# Patient Record
Sex: FEMALE | Race: WHITE | Employment: FULL TIME | URBAN - NONMETROPOLITAN AREA
[De-identification: names, ages, dates, MRNs, and addresses within clinical notes are randomized per-mention and may not be internally consistent; named-entity substitution may affect disease eponyms.]

---

## 2019-01-31 ENCOUNTER — HOSPITAL ENCOUNTER (OUTPATIENT)
Age: 54
Discharge: HOME | End: 2019-01-31
Payer: COMMERCIAL

## 2019-01-31 LAB
ANION GAP SERPL CALCULATED.3IONS-SCNC: 16 MEQ/L (ref 8–16)
BUN BLDV-MCNC: 16 MG/DL (ref 7–22)
CALCIUM SERPL-MCNC: 9.5 MG/DL (ref 8.5–10.5)
CHLORIDE BLD-SCNC: 103 MEQ/L (ref 98–111)
CO2: 24 MEQ/L (ref 23–33)
CREAT SERPL-MCNC: 0.7 MG/DL (ref 0.4–1.2)
EKG ATRIAL RATE: 65 BPM
EKG P AXIS: 10 DEGREES
EKG P-R INTERVAL: 142 MS
EKG Q-T INTERVAL: 412 MS
EKG QRS DURATION: 80 MS
EKG QTC CALCULATION (BAZETT): 428 MS
EKG R AXIS: 55 DEGREES
EKG T AXIS: 29 DEGREES
EKG VENTRICULAR RATE: 65 BPM
ERYTHROCYTE [DISTWIDTH] IN BLOOD BY AUTOMATED COUNT: 14.5 % (ref 11.5–14.5)
ERYTHROCYTE [DISTWIDTH] IN BLOOD BY AUTOMATED COUNT: 49.9 FL (ref 35–45)
GFR SERPL CREATININE-BSD FRML MDRD: 87 ML/MIN/1.73M2
GLUCOSE BLD-MCNC: 92 MG/DL (ref 70–108)
HCT VFR BLD CALC: 45.1 % (ref 37–47)
HEMOGLOBIN: 14.6 GM/DL (ref 12–16)
MCH RBC QN AUTO: 30.4 PG (ref 26–33)
MCHC RBC AUTO-ENTMCNC: 32.4 GM/DL (ref 32.2–35.5)
MCV RBC AUTO: 93.8 FL (ref 81–99)
PLATELET # BLD: 172 THOU/MM3 (ref 130–400)
PMV BLD AUTO: 9.4 FL (ref 9.4–12.4)
POTASSIUM SERPL-SCNC: 4.6 MEQ/L (ref 3.5–5.2)
RBC # BLD: 4.81 MILL/MM3 (ref 4.2–5.4)
SODIUM BLD-SCNC: 143 MEQ/L (ref 135–145)
WBC # BLD: 5.2 THOU/MM3 (ref 4.8–10.8)

## 2019-01-31 PROCEDURE — 93005 ELECTROCARDIOGRAM TRACING: CPT | Performed by: PHYSICIAN ASSISTANT

## 2019-01-31 PROCEDURE — 85027 COMPLETE CBC AUTOMATED: CPT

## 2019-01-31 PROCEDURE — 36415 COLL VENOUS BLD VENIPUNCTURE: CPT

## 2019-01-31 PROCEDURE — 93010 ELECTROCARDIOGRAM REPORT: CPT | Performed by: INTERNAL MEDICINE

## 2019-01-31 PROCEDURE — 80048 BASIC METABOLIC PNL TOTAL CA: CPT

## 2019-02-04 ENCOUNTER — HOSPITAL ENCOUNTER (OUTPATIENT)
Age: 54
Setting detail: OUTPATIENT SURGERY
Discharge: HOME | End: 2019-02-04
Attending: SPECIALIST | Admitting: SPECIALIST
Payer: COMMERCIAL

## 2019-02-04 ENCOUNTER — ANESTHESIA (OUTPATIENT)
Dept: OPERATING ROOM | Age: 54
End: 2019-02-04
Payer: COMMERCIAL

## 2019-02-04 ENCOUNTER — ANESTHESIA EVENT (OUTPATIENT)
Dept: OPERATING ROOM | Age: 54
End: 2019-02-04
Payer: COMMERCIAL

## 2019-02-04 VITALS
OXYGEN SATURATION: 97 % | HEART RATE: 88 BPM | TEMPERATURE: 97 F | WEIGHT: 159 LBS | BODY MASS INDEX: 24.96 KG/M2 | SYSTOLIC BLOOD PRESSURE: 98 MMHG | RESPIRATION RATE: 20 BRPM | DIASTOLIC BLOOD PRESSURE: 56 MMHG | HEIGHT: 67 IN

## 2019-02-04 VITALS
OXYGEN SATURATION: 95 % | DIASTOLIC BLOOD PRESSURE: 54 MMHG | TEMPERATURE: 98.6 F | RESPIRATION RATE: 14 BRPM | SYSTOLIC BLOOD PRESSURE: 84 MMHG

## 2019-02-04 PROCEDURE — 2500000003 HC RX 250 WO HCPCS: Performed by: SPECIALIST

## 2019-02-04 PROCEDURE — 3600000002 HC SURGERY LEVEL 2 BASE: Performed by: SPECIALIST

## 2019-02-04 PROCEDURE — 2500000003 HC RX 250 WO HCPCS: Performed by: REGISTERED NURSE

## 2019-02-04 PROCEDURE — 88305 TISSUE EXAM BY PATHOLOGIST: CPT

## 2019-02-04 PROCEDURE — 3600000012 HC SURGERY LEVEL 2 ADDTL 15MIN: Performed by: SPECIALIST

## 2019-02-04 PROCEDURE — 6360000002 HC RX W HCPCS: Performed by: SPECIALIST

## 2019-02-04 PROCEDURE — 7100000011 HC PHASE II RECOVERY - ADDTL 15 MIN: Performed by: SPECIALIST

## 2019-02-04 PROCEDURE — 7100000010 HC PHASE II RECOVERY - FIRST 15 MIN: Performed by: SPECIALIST

## 2019-02-04 PROCEDURE — 3700000001 HC ADD 15 MINUTES (ANESTHESIA): Performed by: SPECIALIST

## 2019-02-04 PROCEDURE — 88311 DECALCIFY TISSUE: CPT

## 2019-02-04 PROCEDURE — 6360000002 HC RX W HCPCS: Performed by: REGISTERED NURSE

## 2019-02-04 PROCEDURE — 2720000010 HC SURG SUPPLY STERILE: Performed by: SPECIALIST

## 2019-02-04 PROCEDURE — 3700000000 HC ANESTHESIA ATTENDED CARE: Performed by: SPECIALIST

## 2019-02-04 PROCEDURE — 2580000003 HC RX 258: Performed by: SPECIALIST

## 2019-02-04 PROCEDURE — 6370000000 HC RX 637 (ALT 250 FOR IP): Performed by: SPECIALIST

## 2019-02-04 PROCEDURE — 2709999900 HC NON-CHARGEABLE SUPPLY: Performed by: SPECIALIST

## 2019-02-04 RX ORDER — GINSENG 100 MG
CAPSULE ORAL PRN
Status: DISCONTINUED | OUTPATIENT
Start: 2019-02-04 | End: 2019-02-04 | Stop reason: HOSPADM

## 2019-02-04 RX ORDER — SODIUM CHLORIDE 9 MG/ML
INJECTION, SOLUTION INTRAVENOUS CONTINUOUS
Status: DISCONTINUED | OUTPATIENT
Start: 2019-02-04 | End: 2019-02-04 | Stop reason: HOSPADM

## 2019-02-04 RX ORDER — PROPOFOL 10 MG/ML
INJECTION, EMULSION INTRAVENOUS CONTINUOUS PRN
Status: DISCONTINUED | OUTPATIENT
Start: 2019-02-04 | End: 2019-02-04 | Stop reason: SDUPTHER

## 2019-02-04 RX ORDER — LIDOCAINE HYDROCHLORIDE AND EPINEPHRINE 10; 10 MG/ML; UG/ML
INJECTION, SOLUTION INFILTRATION; PERINEURAL PRN
Status: DISCONTINUED | OUTPATIENT
Start: 2019-02-04 | End: 2019-02-04 | Stop reason: HOSPADM

## 2019-02-04 RX ORDER — GLYCOPYRROLATE 1 MG/5 ML
SYRINGE (ML) INTRAVENOUS PRN
Status: DISCONTINUED | OUTPATIENT
Start: 2019-02-04 | End: 2019-02-04 | Stop reason: SDUPTHER

## 2019-02-04 RX ORDER — MIDAZOLAM HYDROCHLORIDE 1 MG/ML
INJECTION INTRAMUSCULAR; INTRAVENOUS PRN
Status: DISCONTINUED | OUTPATIENT
Start: 2019-02-04 | End: 2019-02-04 | Stop reason: SDUPTHER

## 2019-02-04 RX ORDER — FENTANYL CITRATE 50 UG/ML
INJECTION, SOLUTION INTRAMUSCULAR; INTRAVENOUS PRN
Status: DISCONTINUED | OUTPATIENT
Start: 2019-02-04 | End: 2019-02-04 | Stop reason: SDUPTHER

## 2019-02-04 RX ORDER — CEFAZOLIN SODIUM 1 G/50ML
1 INJECTION, SOLUTION INTRAVENOUS
Status: COMPLETED | OUTPATIENT
Start: 2019-02-04 | End: 2019-02-04

## 2019-02-04 RX ADMIN — MIDAZOLAM HYDROCHLORIDE 2 MG: 1 INJECTION, SOLUTION INTRAMUSCULAR; INTRAVENOUS at 10:29

## 2019-02-04 RX ADMIN — CEFAZOLIN SODIUM 2 G: 1 INJECTION, SOLUTION INTRAVENOUS at 10:18

## 2019-02-04 RX ADMIN — PROPOFOL 100 MCG/KG/MIN: 10 INJECTION, EMULSION INTRAVENOUS at 10:17

## 2019-02-04 RX ADMIN — Medication 0.2 MG: at 10:15

## 2019-02-04 RX ADMIN — PHENYLEPHRINE HYDROCHLORIDE 200 MCG: 10 INJECTION INTRAVENOUS at 10:25

## 2019-02-04 RX ADMIN — PHENYLEPHRINE HYDROCHLORIDE 200 MCG: 10 INJECTION INTRAVENOUS at 10:37

## 2019-02-04 RX ADMIN — PHENYLEPHRINE HYDROCHLORIDE 200 MCG: 10 INJECTION INTRAVENOUS at 10:43

## 2019-02-04 RX ADMIN — PHENYLEPHRINE HYDROCHLORIDE 200 MCG: 10 INJECTION INTRAVENOUS at 10:22

## 2019-02-04 RX ADMIN — SODIUM CHLORIDE: 9 INJECTION, SOLUTION INTRAVENOUS at 10:14

## 2019-02-04 RX ADMIN — FENTANYL CITRATE 100 MCG: 50 INJECTION INTRAMUSCULAR; INTRAVENOUS at 10:17

## 2019-02-04 RX ADMIN — PHENYLEPHRINE HYDROCHLORIDE 200 MCG: 10 INJECTION INTRAVENOUS at 10:31

## 2019-02-04 ASSESSMENT — PAIN - FUNCTIONAL ASSESSMENT: PAIN_FUNCTIONAL_ASSESSMENT: 0-10

## 2019-02-04 ASSESSMENT — PULMONARY FUNCTION TESTS
PIF_VALUE: 0

## 2019-02-04 ASSESSMENT — PAIN SCALES - GENERAL: PAINLEVEL_OUTOF10: 0

## 2019-04-16 ENCOUNTER — HOSPITAL ENCOUNTER (OUTPATIENT)
Dept: GENERAL RADIOLOGY | Age: 54
Discharge: HOME | End: 2019-04-16

## 2019-04-16 ENCOUNTER — HOSPITAL ENCOUNTER (OUTPATIENT)
Age: 54
Discharge: HOME | End: 2019-04-16

## 2019-04-16 DIAGNOSIS — S60.212A CONTUSION OF LEFT WRIST, INITIAL ENCOUNTER: ICD-10-CM

## 2019-04-16 DIAGNOSIS — S60.211A CONTUSION OF RIGHT WRIST, INITIAL ENCOUNTER: ICD-10-CM

## 2019-04-16 DIAGNOSIS — S70.01XA CONTUSION OF RIGHT HIP, INITIAL ENCOUNTER: ICD-10-CM

## 2021-01-01 ENCOUNTER — TELEPHONE (OUTPATIENT)
Dept: PSYCHIATRY | Age: 56
End: 2021-01-01

## 2021-01-01 ENCOUNTER — APPOINTMENT (OUTPATIENT)
Dept: GENERAL RADIOLOGY | Age: 56
DRG: 177 | End: 2021-01-01
Payer: COMMERCIAL

## 2021-01-01 ENCOUNTER — VIRTUAL VISIT (OUTPATIENT)
Dept: PSYCHIATRY | Age: 56
End: 2021-01-01
Payer: COMMERCIAL

## 2021-01-01 ENCOUNTER — OFFICE VISIT (OUTPATIENT)
Dept: PSYCHIATRY | Age: 56
End: 2021-01-01
Payer: COMMERCIAL

## 2021-01-01 ENCOUNTER — APPOINTMENT (OUTPATIENT)
Dept: CT IMAGING | Age: 56
DRG: 177 | End: 2021-01-01
Payer: COMMERCIAL

## 2021-01-01 ENCOUNTER — HOSPITAL ENCOUNTER (INPATIENT)
Age: 56
LOS: 14 days | DRG: 177 | End: 2022-01-14
Attending: INTERNAL MEDICINE | Admitting: INTERNAL MEDICINE
Payer: COMMERCIAL

## 2021-01-01 ENCOUNTER — HOSPITAL ENCOUNTER (EMERGENCY)
Age: 56
Discharge: HOME | End: 2021-08-03
Payer: COMMERCIAL

## 2021-01-01 VITALS — WEIGHT: 184.6 LBS | BODY MASS INDEX: 28.97 KG/M2 | HEIGHT: 67 IN

## 2021-01-01 VITALS
HEIGHT: 67 IN | HEART RATE: 91 BPM | RESPIRATION RATE: 20 BRPM | TEMPERATURE: 98.2 F | SYSTOLIC BLOOD PRESSURE: 143 MMHG | BODY MASS INDEX: 28.25 KG/M2 | OXYGEN SATURATION: 96 % | DIASTOLIC BLOOD PRESSURE: 77 MMHG | WEIGHT: 180 LBS

## 2021-01-01 DIAGNOSIS — F41.0 PANIC DISORDER WITHOUT AGORAPHOBIA WITH SEVERE PANIC ATTACKS: ICD-10-CM

## 2021-01-01 DIAGNOSIS — F43.10 PTSD (POST-TRAUMATIC STRESS DISORDER): Primary | ICD-10-CM

## 2021-01-01 DIAGNOSIS — F32.A DEPRESSION WITH SUICIDAL IDEATION: Primary | ICD-10-CM

## 2021-01-01 DIAGNOSIS — F43.10 PTSD (POST-TRAUMATIC STRESS DISORDER): ICD-10-CM

## 2021-01-01 DIAGNOSIS — J96.00 ACUTE RESPIRATORY FAILURE DUE TO COVID-19 (HCC): Primary | ICD-10-CM

## 2021-01-01 DIAGNOSIS — F41.0 PANIC DISORDER WITHOUT AGORAPHOBIA WITH SEVERE PANIC ATTACKS: Primary | ICD-10-CM

## 2021-01-01 DIAGNOSIS — F41.1 ANXIETY STATE: Primary | ICD-10-CM

## 2021-01-01 DIAGNOSIS — Z76.89 ENCOUNTER FOR PSYCHIATRIC ASSESSMENT: ICD-10-CM

## 2021-01-01 DIAGNOSIS — U07.1 ACUTE RESPIRATORY FAILURE DUE TO COVID-19 (HCC): Primary | ICD-10-CM

## 2021-01-01 DIAGNOSIS — R45.851 DEPRESSION WITH SUICIDAL IDEATION: Primary | ICD-10-CM

## 2021-01-01 LAB
ABSOLUTE BASO #: 0 X10E9/L (ref 0–0.2)
ABSOLUTE EOS #: 0.1 X10E9/L (ref 0–0.4)
ABSOLUTE LYMPH #: 1.4 X10E9/L (ref 1–3.5)
ABSOLUTE MONO #: 0.2 X10E9/L (ref 0–0.9)
ABSOLUTE NEUT #: 2.5 X10E9/L (ref 1.5–6.6)
ALBUMIN SERPL-MCNC: 4.3 G/DL (ref 3.5–5.1)
ALBUMIN SERPL-MCNC: 4.4 G/DL (ref 3.2–5.3)
ALK PHOSPHATASE: 77 U/L (ref 39–130)
ALP BLD-CCNC: 78 U/L (ref 38–126)
ALT SERPL-CCNC: 18 U/L (ref 0–31)
ALT SERPL-CCNC: 23 U/L (ref 11–66)
ANION GAP SERPL CALCULATED.3IONS-SCNC: 17 MEQ/L (ref 8–16)
ANION GAP SERPL CALCULATED.3IONS-SCNC: 7 MMOL/L (ref 5–15)
AST SERPL-CCNC: 18 U/L (ref 0–41)
AST SERPL-CCNC: 33 U/L (ref 5–40)
BASOPHILS # BLD: 0 %
BASOPHILS ABSOLUTE: 0 THOU/MM3 (ref 0–0.1)
BASOPHILS RELATIVE PERCENT: 0.5 %
BILIRUB SERPL-MCNC: 0.4 MG/DL (ref 0.3–1.2)
BILIRUB SERPL-MCNC: 0.6 MG/DL (ref 0.3–1.2)
BUN BLDV-MCNC: 14 MG/DL (ref 5–23)
BUN BLDV-MCNC: 7 MG/DL (ref 7–22)
C-REACTIVE PROTEIN: 7.1 MG/DL (ref 0–1)
C-REACTIVE PROTEIN: 7.39 MG/DL (ref 0–1)
CALCIUM SERPL-MCNC: 8.6 MG/DL (ref 8.5–10.5)
CALCIUM SERPL-MCNC: 9.2 MG/DL (ref 8.5–10.5)
CHLORIDE BLD-SCNC: 106 MMOL/L (ref 98–109)
CHLORIDE BLD-SCNC: 97 MEQ/L (ref 98–111)
CO2: 20 MEQ/L (ref 23–33)
CO2: 27 MMOL/L (ref 22–32)
CREAT SERPL-MCNC: 0.7 MG/DL (ref 0.4–1.2)
CREAT SERPL-MCNC: 0.79 MG/DL (ref 0.4–1)
D-DIMER QUANTITATIVE: 881 NG/ML FEU (ref 0–500)
EGFR AFRICAN AMERICAN: >60 ML/MIN/1.73SQ.M
EGFR IF NONAFRICAN AMERICAN: >60 ML/MIN/1.73SQ.M
EOSINOPHIL # BLD: 0 %
EOSINOPHILS ABSOLUTE: 0 THOU/MM3 (ref 0–0.4)
EOSINOPHILS RELATIVE PERCENT: 1.7 %
ERYTHROCYTE [DISTWIDTH] IN BLOOD BY AUTOMATED COUNT: 14.2 % (ref 11.5–14.5)
ERYTHROCYTE [DISTWIDTH] IN BLOOD BY AUTOMATED COUNT: 47.3 FL (ref 35–45)
FERRITIN: 1124 NG/ML (ref 10–291)
FLU A ANTIGEN: NEGATIVE
FLU B ANTIGEN: NEGATIVE
FOLATE: 5 NG/ML
GFR SERPL CREATININE-BSD FRML MDRD: 86 ML/MIN/1.73M2
GLUCOSE BLD-MCNC: 104 MG/DL (ref 70–108)
GLUCOSE BLD-MCNC: 107 MG/DL (ref 70–108)
GLUCOSE: 111 MG/DL (ref 65–99)
HCT VFR BLD CALC: 42.2 % (ref 35–47)
HCT VFR BLD CALC: 42.5 % (ref 37–47)
HEMOGLOBIN: 14.1 G/DL (ref 11.7–15.5)
HEMOGLOBIN: 14.1 GM/DL (ref 12–16)
IMMATURE GRANS (ABS): 0.01 THOU/MM3 (ref 0–0.07)
IMMATURE GRANULOCYTES: 0.5 %
LD: 378 U/L (ref 100–190)
LIPASE: 60.7 U/L (ref 5.6–51.3)
LYMPHOCYTE %: 32.3 %
LYMPHOCYTES # BLD: 20.7 %
LYMPHOCYTES ABSOLUTE: 0.5 THOU/MM3 (ref 1–4.8)
MAGNESIUM: 2 MG/DL (ref 1.6–2.4)
MCH RBC QN AUTO: 30.3 PG (ref 26–33)
MCH RBC QN AUTO: 30.6 PG (ref 27–34)
MCHC RBC AUTO-ENTMCNC: 33.2 GM/DL (ref 32.2–35.5)
MCHC RBC AUTO-ENTMCNC: 33.5 G/DL (ref 32–36)
MCV RBC AUTO: 91 FL (ref 80–100)
MCV RBC AUTO: 91.2 FL (ref 81–99)
MONOCYTES # BLD: 2.3 %
MONOCYTES # BLD: 5.9 %
MONOCYTES ABSOLUTE: 0.1 THOU/MM3 (ref 0.4–1.3)
NEUTROPHILS RELATIVE PERCENT: 59.6 %
NUCLEATED RED BLOOD CELLS: 0 /100 WBC
OSMOLALITY CALCULATION: 266.7 MOSMOL/KG (ref 275–300)
PDW BLD-RTO: 14.8 % (ref 11.5–15)
PLATELET # BLD: 101 THOU/MM3 (ref 130–400)
PLATELETS: 197 X10E9/L (ref 150–450)
PMV BLD AUTO: 7.3 FL (ref 7–12)
PMV BLD AUTO: 9.7 FL (ref 9.4–12.4)
POTASSIUM REFLEX MAGNESIUM: 3.4 MEQ/L (ref 3.5–5.2)
POTASSIUM SERPL-SCNC: 4.3 MMOL/L (ref 3.5–5)
PROCALCITONIN: 0.08 NG/ML (ref 0.01–0.09)
RBC # BLD: 4.66 MILL/MM3 (ref 4.2–5.4)
RBC: 4.62 X10E12/L (ref 3.8–5.2)
SARS-COV-2, NAAT: DETECTED
SEG NEUTROPHILS: 76.5 %
SEGMENTED NEUTROPHILS ABSOLUTE COUNT: 1.7 THOU/MM3 (ref 1.8–7.7)
SODIUM BLD-SCNC: 134 MEQ/L (ref 135–145)
SODIUM BLD-SCNC: 140 MMOL/L (ref 134–146)
T4 FREE: 0.77 NG/DL (ref 0.61–1.6)
TOTAL PROTEIN: 6.6 G/DL (ref 6–8)
TOTAL PROTEIN: 6.7 G/DL (ref 6.1–8)
TROPONIN T: < 0.01 NG/ML
TSH SERPL DL<=0.05 MIU/L-ACNC: 2.95 UIU/ML (ref 0.49–4.67)
VITAMIN B-12: 441 PG/ML (ref 180–914)
WBC # BLD: 2.2 THOU/MM3 (ref 4.8–10.8)
WBC: 4.2 X10E9/L (ref 4–11)

## 2021-01-01 PROCEDURE — 83690 ASSAY OF LIPASE: CPT

## 2021-01-01 PROCEDURE — 93005 ELECTROCARDIOGRAM TRACING: CPT | Performed by: INTERNAL MEDICINE

## 2021-01-01 PROCEDURE — 99214 OFFICE O/P EST MOD 30 MIN: CPT | Performed by: NURSE PRACTITIONER

## 2021-01-01 PROCEDURE — 90837 PSYTX W PT 60 MINUTES: CPT | Performed by: NURSE PRACTITIONER

## 2021-01-01 PROCEDURE — 83036 HEMOGLOBIN GLYCOSYLATED A1C: CPT

## 2021-01-01 PROCEDURE — 84484 ASSAY OF TROPONIN QUANT: CPT

## 2021-01-01 PROCEDURE — 6360000004 HC RX CONTRAST MEDICATION: Performed by: INTERNAL MEDICINE

## 2021-01-01 PROCEDURE — 90836 PSYTX W PT W E/M 45 MIN: CPT | Performed by: NURSE PRACTITIONER

## 2021-01-01 PROCEDURE — 90838 PSYTX W PT W E/M 60 MIN: CPT | Performed by: NURSE PRACTITIONER

## 2021-01-01 PROCEDURE — 90792 PSYCH DIAG EVAL W/MED SRVCS: CPT | Performed by: NURSE PRACTITIONER

## 2021-01-01 PROCEDURE — 83735 ASSAY OF MAGNESIUM: CPT

## 2021-01-01 PROCEDURE — 82948 REAGENT STRIP/BLOOD GLUCOSE: CPT

## 2021-01-01 PROCEDURE — 71275 CT ANGIOGRAPHY CHEST: CPT

## 2021-01-01 PROCEDURE — 99215 OFFICE O/P EST HI 40 MIN: CPT | Performed by: NURSE PRACTITIONER

## 2021-01-01 PROCEDURE — 2580000003 HC RX 258: Performed by: INTERNAL MEDICINE

## 2021-01-01 PROCEDURE — 99223 1ST HOSP IP/OBS HIGH 75: CPT | Performed by: INTERNAL MEDICINE

## 2021-01-01 PROCEDURE — 99213 OFFICE O/P EST LOW 20 MIN: CPT | Performed by: NURSE PRACTITIONER

## 2021-01-01 PROCEDURE — 85025 COMPLETE CBC W/AUTO DIFF WBC: CPT

## 2021-01-01 PROCEDURE — 6360000002 HC RX W HCPCS: Performed by: INTERNAL MEDICINE

## 2021-01-01 PROCEDURE — 84145 PROCALCITONIN (PCT): CPT

## 2021-01-01 PROCEDURE — 85379 FIBRIN DEGRADATION QUANT: CPT

## 2021-01-01 PROCEDURE — 82728 ASSAY OF FERRITIN: CPT

## 2021-01-01 PROCEDURE — 80053 COMPREHEN METABOLIC PANEL: CPT

## 2021-01-01 PROCEDURE — 99282 EMERGENCY DEPT VISIT SF MDM: CPT

## 2021-01-01 PROCEDURE — 71045 X-RAY EXAM CHEST 1 VIEW: CPT

## 2021-01-01 PROCEDURE — 83615 LACTATE (LD) (LDH) ENZYME: CPT

## 2021-01-01 PROCEDURE — 2060000000 HC ICU INTERMEDIATE R&B

## 2021-01-01 PROCEDURE — 6370000000 HC RX 637 (ALT 250 FOR IP): Performed by: INTERNAL MEDICINE

## 2021-01-01 PROCEDURE — 36415 COLL VENOUS BLD VENIPUNCTURE: CPT

## 2021-01-01 PROCEDURE — 86140 C-REACTIVE PROTEIN: CPT

## 2021-01-01 PROCEDURE — 87804 INFLUENZA ASSAY W/OPTIC: CPT

## 2021-01-01 PROCEDURE — 99354 PR PROLONGED SVC OUTPATIENT SETTING 1ST HOUR: CPT | Performed by: NURSE PRACTITIONER

## 2021-01-01 PROCEDURE — 90833 PSYTX W PT W E/M 30 MIN: CPT | Performed by: NURSE PRACTITIONER

## 2021-01-01 PROCEDURE — 87899 AGENT NOS ASSAY W/OPTIC: CPT

## 2021-01-01 PROCEDURE — 87635 SARS-COV-2 COVID-19 AMP PRB: CPT

## 2021-01-01 PROCEDURE — 99285 EMERGENCY DEPT VISIT HI MDM: CPT

## 2021-01-01 PROCEDURE — 87449 NOS EACH ORGANISM AG IA: CPT

## 2021-01-01 PROCEDURE — XW0DXM6 INTRODUCTION OF BARICITINIB INTO MOUTH AND PHARYNX, EXTERNAL APPROACH, NEW TECHNOLOGY GROUP 6: ICD-10-PCS | Performed by: INTERNAL MEDICINE

## 2021-01-01 RX ORDER — ONDANSETRON 4 MG/1
4 TABLET, ORALLY DISINTEGRATING ORAL EVERY 8 HOURS PRN
Status: DISCONTINUED | OUTPATIENT
Start: 2021-01-01 | End: 2022-01-01 | Stop reason: HOSPADM

## 2021-01-01 RX ORDER — DESVENLAFAXINE 25 MG/1
25 TABLET, EXTENDED RELEASE ORAL DAILY
Qty: 30 TABLET | Refills: 1 | Status: SHIPPED | OUTPATIENT
Start: 2021-01-01 | End: 2021-01-01 | Stop reason: DRUGHIGH

## 2021-01-01 RX ORDER — GUAIFENESIN 600 MG/1
600 TABLET, EXTENDED RELEASE ORAL 2 TIMES DAILY
Status: DISCONTINUED | OUTPATIENT
Start: 2021-01-01 | End: 2022-01-01

## 2021-01-01 RX ORDER — DEXTROSE MONOHYDRATE 25 G/50ML
12.5 INJECTION, SOLUTION INTRAVENOUS PRN
Status: DISCONTINUED | OUTPATIENT
Start: 2021-01-01 | End: 2022-01-01

## 2021-01-01 RX ORDER — NICOTINE POLACRILEX 4 MG
15 LOZENGE BUCCAL PRN
Status: DISCONTINUED | OUTPATIENT
Start: 2021-01-01 | End: 2022-01-01

## 2021-01-01 RX ORDER — BUTALBITAL, ACETAMINOPHEN AND CAFFEINE 50; 325; 40 MG/1; MG/1; MG/1
1 TABLET ORAL ONCE
Status: COMPLETED | OUTPATIENT
Start: 2021-01-01 | End: 2021-01-01

## 2021-01-01 RX ORDER — LEVOMEFOLATE/ALGAL OIL 15-90.314
1 CAPSULE ORAL DAILY
Qty: 90 CAPSULE | Refills: 3 | Status: SHIPPED | OUTPATIENT
Start: 2021-01-01 | End: 2021-01-01

## 2021-01-01 RX ORDER — TRAZODONE HYDROCHLORIDE 50 MG/1
TABLET ORAL
Qty: 60 TABLET | Refills: 1 | Status: SHIPPED | OUTPATIENT
Start: 2021-01-01 | End: 2021-01-01 | Stop reason: SINTOL

## 2021-01-01 RX ORDER — ASCORBIC ACID 500 MG
1000 TABLET ORAL DAILY
Status: DISCONTINUED | OUTPATIENT
Start: 2022-01-01 | End: 2022-01-01

## 2021-01-01 RX ORDER — DESVENLAFAXINE 100 MG/1
100 TABLET, EXTENDED RELEASE ORAL DAILY
Status: DISCONTINUED | OUTPATIENT
Start: 2021-01-01 | End: 2022-01-01

## 2021-01-01 RX ORDER — VENLAFAXINE HYDROCHLORIDE 75 MG/1
CAPSULE, EXTENDED RELEASE ORAL
Qty: 30 CAPSULE | Refills: 0 | Status: SHIPPED | OUTPATIENT
Start: 2021-01-01 | End: 2021-01-01 | Stop reason: DRUGHIGH

## 2021-01-01 RX ORDER — ACETAMINOPHEN 325 MG/1
650 TABLET ORAL EVERY 6 HOURS PRN
Status: DISCONTINUED | OUTPATIENT
Start: 2021-01-01 | End: 2022-01-01 | Stop reason: HOSPADM

## 2021-01-01 RX ORDER — VENLAFAXINE HYDROCHLORIDE 150 MG/1
150 CAPSULE, EXTENDED RELEASE ORAL DAILY
Qty: 30 CAPSULE | Refills: 1 | Status: SHIPPED | OUTPATIENT
Start: 2021-01-01 | End: 2021-01-01 | Stop reason: SDUPTHER

## 2021-01-01 RX ORDER — POTASSIUM CHLORIDE 20 MEQ/1
40 TABLET, EXTENDED RELEASE ORAL ONCE
Status: COMPLETED | OUTPATIENT
Start: 2021-01-01 | End: 2021-01-01

## 2021-01-01 RX ORDER — BUSPIRONE HYDROCHLORIDE 15 MG/1
15 TABLET ORAL 3 TIMES DAILY
Qty: 90 TABLET | Refills: 1 | Status: SHIPPED | OUTPATIENT
Start: 2021-01-01 | End: 2021-01-01 | Stop reason: DRUGHIGH

## 2021-01-01 RX ORDER — DEXTROSE MONOHYDRATE 50 MG/ML
100 INJECTION, SOLUTION INTRAVENOUS PRN
Status: DISCONTINUED | OUTPATIENT
Start: 2021-01-01 | End: 2022-01-01

## 2021-01-01 RX ORDER — CLONAZEPAM 0.5 MG/1
0.5 TABLET ORAL 2 TIMES DAILY PRN
Qty: 60 TABLET | Refills: 0 | Status: SHIPPED | OUTPATIENT
Start: 2021-01-01 | End: 2021-01-01

## 2021-01-01 RX ORDER — SODIUM CHLORIDE 9 MG/ML
INJECTION, SOLUTION INTRAVENOUS CONTINUOUS
Status: DISCONTINUED | OUTPATIENT
Start: 2021-01-01 | End: 2022-01-01

## 2021-01-01 RX ORDER — MAGNESIUM SULFATE IN WATER 40 MG/ML
2000 INJECTION, SOLUTION INTRAVENOUS PRN
Status: DISCONTINUED | OUTPATIENT
Start: 2021-01-01 | End: 2022-01-01

## 2021-01-01 RX ORDER — ARIPIPRAZOLE 2 MG/1
2 TABLET ORAL DAILY
Qty: 30 TABLET | Refills: 1 | Status: SHIPPED | OUTPATIENT
Start: 2021-01-01 | End: 2021-01-01

## 2021-01-01 RX ORDER — DESVENLAFAXINE 100 MG/1
100 TABLET, EXTENDED RELEASE ORAL DAILY
Qty: 30 TABLET | Refills: 1 | Status: SHIPPED | OUTPATIENT
Start: 2021-01-01 | End: 2021-01-01 | Stop reason: SDUPTHER

## 2021-01-01 RX ORDER — VITAMIN B COMPLEX
2000 TABLET ORAL DAILY
Status: DISCONTINUED | OUTPATIENT
Start: 2022-01-01 | End: 2022-01-01

## 2021-01-01 RX ORDER — CLONAZEPAM 0.5 MG/1
0.5 TABLET ORAL 2 TIMES DAILY PRN
Qty: 30 TABLET | Refills: 0
Start: 2021-01-01 | End: 2021-01-01 | Stop reason: SDUPTHER

## 2021-01-01 RX ORDER — POTASSIUM CHLORIDE 7.45 MG/ML
10 INJECTION INTRAVENOUS PRN
Status: DISCONTINUED | OUTPATIENT
Start: 2021-01-01 | End: 2022-01-01

## 2021-01-01 RX ORDER — BUSPIRONE HYDROCHLORIDE 30 MG/1
30 TABLET ORAL 2 TIMES DAILY
Qty: 60 TABLET | Refills: 1 | Status: SHIPPED | OUTPATIENT
Start: 2021-01-01 | End: 2021-01-01

## 2021-01-01 RX ORDER — CLONAZEPAM 0.5 MG/1
0.25 TABLET ORAL 2 TIMES DAILY PRN
Qty: 30 TABLET | Refills: 0 | Status: SHIPPED | OUTPATIENT
Start: 2021-01-01 | End: 2021-01-01 | Stop reason: DRUGHIGH

## 2021-01-01 RX ORDER — ALBUTEROL SULFATE 90 UG/1
2 AEROSOL, METERED RESPIRATORY (INHALATION) 4 TIMES DAILY
Status: DISCONTINUED | OUTPATIENT
Start: 2021-01-01 | End: 2022-01-01

## 2021-01-01 RX ORDER — BUSPIRONE HYDROCHLORIDE 10 MG/1
10 TABLET ORAL 3 TIMES DAILY
COMMUNITY
Start: 2021-01-01 | End: 2021-01-01 | Stop reason: DRUGHIGH

## 2021-01-01 RX ORDER — DESVENLAFAXINE 50 MG/1
50 TABLET, EXTENDED RELEASE ORAL DAILY
Qty: 30 TABLET | Refills: 0 | Status: SHIPPED | OUTPATIENT
Start: 2021-01-01 | End: 2021-01-01

## 2021-01-01 RX ORDER — DESVENLAFAXINE 50 MG/1
50 TABLET, EXTENDED RELEASE ORAL DAILY
Qty: 30 TABLET | Refills: 0 | Status: SHIPPED | OUTPATIENT
Start: 2021-01-01 | End: 2021-01-01 | Stop reason: DRUGHIGH

## 2021-01-01 RX ORDER — DESVENLAFAXINE 100 MG/1
100 TABLET, EXTENDED RELEASE ORAL DAILY
Qty: 30 TABLET | Refills: 1 | Status: SHIPPED | OUTPATIENT
Start: 2021-01-01

## 2021-01-01 RX ORDER — SERTRALINE HYDROCHLORIDE 100 MG/1
TABLET, FILM COATED ORAL
Qty: 11 TABLET | Refills: 0
Start: 2021-01-01 | End: 2021-01-01 | Stop reason: ALTCHOICE

## 2021-01-01 RX ORDER — BUSPIRONE HYDROCHLORIDE 30 MG/1
TABLET ORAL
Qty: 60 TABLET | Refills: 0 | Status: SHIPPED | OUTPATIENT
Start: 2021-01-01 | End: 2021-01-01 | Stop reason: SDUPTHER

## 2021-01-01 RX ORDER — ONDANSETRON 2 MG/ML
4 INJECTION INTRAMUSCULAR; INTRAVENOUS EVERY 6 HOURS PRN
Status: DISCONTINUED | OUTPATIENT
Start: 2021-01-01 | End: 2022-01-01 | Stop reason: HOSPADM

## 2021-01-01 RX ORDER — SODIUM CHLORIDE 0.9 % (FLUSH) 0.9 %
5-40 SYRINGE (ML) INJECTION PRN
Status: DISCONTINUED | OUTPATIENT
Start: 2021-01-01 | End: 2022-01-01 | Stop reason: HOSPADM

## 2021-01-01 RX ORDER — SERTRALINE HYDROCHLORIDE 100 MG/1
150 TABLET, FILM COATED ORAL NIGHTLY
COMMUNITY
Start: 2021-01-01 | End: 2021-01-01 | Stop reason: DRUGHIGH

## 2021-01-01 RX ORDER — FAMOTIDINE 20 MG/1
20 TABLET, FILM COATED ORAL 2 TIMES DAILY
Status: DISCONTINUED | OUTPATIENT
Start: 2021-01-01 | End: 2022-01-01

## 2021-01-01 RX ORDER — POLYETHYLENE GLYCOL 3350 17 G/17G
17 POWDER, FOR SOLUTION ORAL DAILY PRN
Status: DISCONTINUED | OUTPATIENT
Start: 2021-01-01 | End: 2022-01-01

## 2021-01-01 RX ORDER — BUSPIRONE HYDROCHLORIDE 30 MG/1
TABLET ORAL
Qty: 60 TABLET | Refills: 1 | Status: SHIPPED | OUTPATIENT
Start: 2021-01-01 | End: 2021-01-01

## 2021-01-01 RX ORDER — ZINC SULFATE 50(220)MG
50 CAPSULE ORAL DAILY
Status: DISCONTINUED | OUTPATIENT
Start: 2022-01-01 | End: 2022-01-01

## 2021-01-01 RX ORDER — VENLAFAXINE HYDROCHLORIDE 150 MG/1
150 CAPSULE, EXTENDED RELEASE ORAL DAILY
Qty: 30 CAPSULE | Refills: 1 | Status: SHIPPED | OUTPATIENT
Start: 2021-01-01 | End: 2021-01-01

## 2021-01-01 RX ORDER — SODIUM CHLORIDE 0.9 % (FLUSH) 0.9 %
5-40 SYRINGE (ML) INJECTION EVERY 12 HOURS SCHEDULED
Status: DISCONTINUED | OUTPATIENT
Start: 2021-01-01 | End: 2022-01-01 | Stop reason: HOSPADM

## 2021-01-01 RX ORDER — VENLAFAXINE HYDROCHLORIDE 37.5 MG/1
CAPSULE, EXTENDED RELEASE ORAL
Qty: 60 CAPSULE | Refills: 0 | Status: SHIPPED | OUTPATIENT
Start: 2021-01-01 | End: 2021-01-01 | Stop reason: SDUPTHER

## 2021-01-01 RX ORDER — SODIUM CHLORIDE 9 MG/ML
25 INJECTION, SOLUTION INTRAVENOUS PRN
Status: DISCONTINUED | OUTPATIENT
Start: 2021-01-01 | End: 2022-01-01 | Stop reason: HOSPADM

## 2021-01-01 RX ORDER — VENLAFAXINE HYDROCHLORIDE 37.5 MG/1
CAPSULE, EXTENDED RELEASE ORAL
Qty: 60 CAPSULE | Refills: 0 | OUTPATIENT
Start: 2021-01-01

## 2021-01-01 RX ORDER — DEXAMETHASONE SODIUM PHOSPHATE 4 MG/ML
6 INJECTION, SOLUTION INTRA-ARTICULAR; INTRALESIONAL; INTRAMUSCULAR; INTRAVENOUS; SOFT TISSUE EVERY 24 HOURS
Status: DISCONTINUED | OUTPATIENT
Start: 2021-01-01 | End: 2022-01-01

## 2021-01-01 RX ORDER — ACETAMINOPHEN 325 MG/1
650 TABLET ORAL ONCE
Status: COMPLETED | OUTPATIENT
Start: 2021-01-01 | End: 2021-01-01

## 2021-01-01 RX ORDER — ACETAMINOPHEN 650 MG/1
650 SUPPOSITORY RECTAL EVERY 6 HOURS PRN
Status: DISCONTINUED | OUTPATIENT
Start: 2021-01-01 | End: 2022-01-01 | Stop reason: HOSPADM

## 2021-01-01 RX ADMIN — ACETAMINOPHEN 650 MG: 325 TABLET ORAL at 16:33

## 2021-01-01 RX ADMIN — IOPAMIDOL 80 ML: 755 INJECTION, SOLUTION INTRAVENOUS at 19:17

## 2021-01-01 RX ADMIN — ENOXAPARIN SODIUM 30 MG: 100 INJECTION SUBCUTANEOUS at 21:55

## 2021-01-01 RX ADMIN — DESVENLAFAXINE 100 MG: 100 TABLET, EXTENDED RELEASE ORAL at 21:55

## 2021-01-01 RX ADMIN — POTASSIUM CHLORIDE 40 MEQ: 1500 TABLET, EXTENDED RELEASE ORAL at 19:08

## 2021-01-01 RX ADMIN — ACETAMINOPHEN 650 MG: 325 TABLET ORAL at 22:05

## 2021-01-01 RX ADMIN — SODIUM CHLORIDE: 9 INJECTION, SOLUTION INTRAVENOUS at 21:54

## 2021-01-01 RX ADMIN — ONDANSETRON 4 MG: 4 TABLET, ORALLY DISINTEGRATING ORAL at 22:05

## 2021-01-01 RX ADMIN — BARICITINIB 4 MG: 2 TABLET, FILM COATED ORAL at 21:55

## 2021-01-01 RX ADMIN — DEXAMETHASONE SODIUM PHOSPHATE 6 MG: 4 INJECTION, SOLUTION INTRA-ARTICULAR; INTRALESIONAL; INTRAMUSCULAR; INTRAVENOUS; SOFT TISSUE at 21:54

## 2021-01-01 RX ADMIN — ALBUTEROL SULFATE 2 PUFF: 90 AEROSOL, METERED RESPIRATORY (INHALATION) at 22:11

## 2021-01-01 RX ADMIN — GUAIFENESIN 600 MG: 600 TABLET, EXTENDED RELEASE ORAL at 21:55

## 2021-01-01 RX ADMIN — FAMOTIDINE 20 MG: 20 TABLET ORAL at 21:55

## 2021-01-01 RX ADMIN — BUTALBITAL, ACETAMINOPHEN, AND CAFFEINE 1 TABLET: 50; 325; 40 TABLET ORAL at 19:09

## 2021-01-01 SDOH — ECONOMIC STABILITY: TRANSPORTATION INSECURITY
IN THE PAST 12 MONTHS, HAS LACK OF TRANSPORTATION KEPT YOU FROM MEETINGS, WORK, OR FROM GETTING THINGS NEEDED FOR DAILY LIVING?: NO

## 2021-01-01 SDOH — ECONOMIC STABILITY: TRANSPORTATION INSECURITY
IN THE PAST 12 MONTHS, HAS THE LACK OF TRANSPORTATION KEPT YOU FROM MEDICAL APPOINTMENTS OR FROM GETTING MEDICATIONS?: NO

## 2021-01-01 SDOH — ECONOMIC STABILITY: INCOME INSECURITY: HOW HARD IS IT FOR YOU TO PAY FOR THE VERY BASICS LIKE FOOD, HOUSING, MEDICAL CARE, AND HEATING?: NOT HARD AT ALL

## 2021-01-01 ASSESSMENT — PAIN SCALES - GENERAL
PAINLEVEL_OUTOF10: 7
PAINLEVEL_OUTOF10: 2
PAINLEVEL_OUTOF10: 8
PAINLEVEL_OUTOF10: 8
PAINLEVEL_OUTOF10: 7
PAINLEVEL_OUTOF10: 5
PAINLEVEL_OUTOF10: 10

## 2021-01-01 ASSESSMENT — PATIENT HEALTH QUESTIONNAIRE - PHQ9
1. LITTLE INTEREST OR PLEASURE IN DOING THINGS: 3
2. FEELING DOWN, DEPRESSED OR HOPELESS: 3
10. IF YOU CHECKED OFF ANY PROBLEMS, HOW DIFFICULT HAVE THESE PROBLEMS MADE IT FOR YOU TO DO YOUR WORK, TAKE CARE OF THINGS AT HOME, OR GET ALONG WITH OTHER PEOPLE: 1
SUM OF ALL RESPONSES TO PHQ QUESTIONS 1-9: 16
SUM OF ALL RESPONSES TO PHQ QUESTIONS 1-9: 14
SUM OF ALL RESPONSES TO PHQ QUESTIONS 1-9: 16
SUM OF ALL RESPONSES TO PHQ9 QUESTIONS 1 & 2: 6
8. MOVING OR SPEAKING SO SLOWLY THAT OTHER PEOPLE COULD HAVE NOTICED. OR THE OPPOSITE, BEING SO FIGETY OR RESTLESS THAT YOU HAVE BEEN MOVING AROUND A LOT MORE THAN USUAL: 2
9. THOUGHTS THAT YOU WOULD BE BETTER OFF DEAD, OR OF HURTING YOURSELF: 0
4. FEELING TIRED OR HAVING LITTLE ENERGY: 1
3. TROUBLE FALLING OR STAYING ASLEEP: 2
7. TROUBLE CONCENTRATING ON THINGS, SUCH AS READING THE NEWSPAPER OR WATCHING TELEVISION: 3
SUM OF ALL RESPONSES TO PHQ QUESTIONS 1-9: 16
5. POOR APPETITE OR OVEREATING: 0
6. FEELING BAD ABOUT YOURSELF - OR THAT YOU ARE A FAILURE OR HAVE LET YOURSELF OR YOUR FAMILY DOWN: 2

## 2021-01-01 ASSESSMENT — ENCOUNTER SYMPTOMS
ABDOMINAL PAIN: 0
COUGH: 0
SHORTNESS OF BREATH: 0
NAUSEA: 0
VOMITING: 0
RHINORRHEA: 0

## 2021-01-01 ASSESSMENT — SLEEP AND FATIGUE QUESTIONNAIRES
DO YOU HAVE DIFFICULTY SLEEPING: YES
DIFFICULTY FALLING ASLEEP: NO
SLEEP PATTERN: DISTURBED/INTERRUPTED SLEEP
DO YOU USE A SLEEP AID: NO
DIFFICULTY STAYING ASLEEP: YES
DIFFICULTY ARISING: NO
RESTFUL SLEEP: NO

## 2021-01-01 ASSESSMENT — PAIN DESCRIPTION - DESCRIPTORS
DESCRIPTORS: ACHING
DESCRIPTORS: ACHING
DESCRIPTORS: CONSTANT

## 2021-01-01 ASSESSMENT — ANXIETY QUESTIONNAIRES
GAD7 TOTAL SCORE: 18
3. WORRYING TOO MUCH ABOUT DIFFERENT THINGS: 3-NEARLY EVERY DAY
7. FEELING AFRAID AS IF SOMETHING AWFUL MIGHT HAPPEN: 2-OVER HALF THE DAYS
5. BEING SO RESTLESS THAT IT IS HARD TO SIT STILL: 3-NEARLY EVERY DAY
2. NOT BEING ABLE TO STOP OR CONTROL WORRYING: 3-NEARLY EVERY DAY
6. BECOMING EASILY ANNOYED OR IRRITABLE: 2-OVER HALF THE DAYS
4. TROUBLE RELAXING: 2-OVER HALF THE DAYS
1. FEELING NERVOUS, ANXIOUS, OR ON EDGE: 3-NEARLY EVERY DAY

## 2021-01-01 ASSESSMENT — PAIN DESCRIPTION - LOCATION
LOCATION: CHEST;HEAD

## 2021-01-01 ASSESSMENT — PAIN DESCRIPTION - FREQUENCY
FREQUENCY: CONTINUOUS

## 2021-01-01 ASSESSMENT — PAIN DESCRIPTION - PAIN TYPE
TYPE: ACUTE PAIN
TYPE: ACUTE PAIN

## 2021-01-01 ASSESSMENT — LIFESTYLE VARIABLES: HISTORY_ALCOHOL_USE: NO
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
## 2021-05-03 NOTE — TELEPHONE ENCOUNTER
Rianna is requesting a medication refill on Alycia's behalf for Buspar 30mg;#60 with 1 refill;last with a start date of 03/05/21 and last fill date of 04/01/21/. She does not return until 05/04/21; leaving her short of medication. Medication is pending your approval for a 30 day supply with 0 refill.
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
## 2021-07-20 NOTE — TELEPHONE ENCOUNTER
Please ask patient to obtain copy of exact policy from employer. Also, clarify if she does or does not have intention of getting the COVID-19 vaccine.

## 2021-07-20 NOTE — TELEPHONE ENCOUNTER
Message received via [employer]:    I was contacted by [facility] today requesting that I make sure paperwork from you for my disability claim is submitted to them by July 30, 2021. She said she faxed it to the office some time ago but had not received it back.  If you could check if it was received or not and let me know if it wasn't so I can request it be sent again I would appreciate it.     Thank you     Beata Ely

## 2021-07-20 NOTE — TELEPHONE ENCOUNTER
Patient's response via MyChart:    I believe GD is requiring mask or vaccine. I have to intention on getting a vaccine.

## 2021-07-20 NOTE — TELEPHONE ENCOUNTER
Patient cancelled her last appointment. She mentioned during last visit that she was planning to return to work in July 2021. She has been resistant to treatment recommendations, specifically therapy. What is the current policy at her place of employment regarding masks/facial coverings, which is the reason she was taken off work initially.

## 2021-07-21 NOTE — TELEPHONE ENCOUNTER
Reattached message:    I have requested a copy of my employer's policy via my . It will be forwarded to your office as soon as possible. I am not getting any covid shots for numerous personal reasons and beliefs.

## 2021-07-27 NOTE — TELEPHONE ENCOUNTER
The policy does not state proof of vaccination is required nor does it state it is required to be exempt from facial coverings; the policy explicitly states proof of vaccination status is voluntary and that it is used by HR only and for purposes other than regulating masking. Additionally, it states that masks do not need to be worn if able to maintain 6ft distance from others while at the individual's workstation/desk. Is the patient able to maintain 6ft distance while at work?
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
## 2021-07-29 NOTE — TELEPHONE ENCOUNTER
Patient was seen for an appt on 07/28/21 to further discuss; patient was extended off work until 11/01/2021; paperwork will be completed for this time. Provider is aware that the office note will need to be completed.

## 2021-08-03 NOTE — PROGRESS NOTES
Provisional Diagnosis:     Unspecified Depressive Disorder                                            Panic Disorder without agoraphobia per history     Risk, Psychosocial and Contextual Factors:  Increased anxiety due to fear of state mandating masks again as a result of the rise in covid cases    Current  Treatment:  [facility] (Danita Lu CNP), counseling on [facility] in [facility] [place]      Present Suicidal Behavior:      Verbal:  Denies         Attempt:  Denies     Access to Weapons:  Denies     Current Suicide Risk: Low, Moderate or High:    Low     Past Suicidal Behavior:       Verbal:  Pt has had thoughts \"it would be way easier if this wasn't a problem\" referring to her anxiety and panic attacks however denies suicidal thoughts     Attempt:  Denies     Self-Injurious/Self-Mutilation:   Denies     Traumatic Event Within Past 2 Weeks:   [place] yesterday that masks may be mandated again, pt has not worked in a year due to increased anxiety from the mask mandate    Current Abuse: Denies     Legal:  Denies     Violence: Denies     Protective Factors:  Pt is linked to outpatient mental health services,  and daughter are supportive     Housing:    Resides with  and 25year old daughter     CPAP/Oxygen/Ambulation Difficulties:  Denies     Basic Vital Signs Normal?: Check with Patients Nurse prior to Calling Psychiatry    Critical Labs?: Check with Patients Nurse prior to Calling Psychiatry    Clinical Summary:      Pt is a 61year old female with a history of PTSD, depression, panic disorder without agoraphobia and panic attacks escorted to [facility] ED by [facility] via [facility] completed by Danita Lu of [facility]. Please refer to office visit completed today with Gely Menjivar listed in [facility]. [facility] states:    Pt expressed suicidal ideations that are constant, intrusive and difficult to stop. She expresses increasing agitation.  Has no insight on a plan to stop the thoughts. Anxiety continues to increase. Pt very agitated during session. Pt states she is in a \"criticcal stage\" and cannot see a future. During session pt was very restless, limited insight, and expressed aggression. Pt reportedly had a 10am visit with Lori Vela today, state she struggles with anxiety, PTSD and panic attacks related to [facility]". Pt reportedly has not worked in a year due to the [employer] as a result of COVID. The mask mandate was lifted however pt state \"I heard yesterday that they are bringing the mask mandate back\" which increased pts anxiety. Pt state Anh \"determined I was wanting to harm myself, I'm not\". Pt report feeling \"high stress and agitated\" with questions being asked by Lori Vela today. Pt state \"it would be way easier if this wasn't a problem but I'm not suicidal\". Pt reportedly was informed by Lori Vela that she had a chose to present to the ED to be evaluated by the [facility] voluntarily or by law enforcement. Pt chose law enforcement, left the office and was picked up at home. Suicidal thoughts denied, homicidal thoughts denied, hallucinations denied, pt report feeling paranoid when in a store with a lot of people \"I get paranoid about what people are saying\". Pt denies alcohol/drug use, report interrupted sleep, normal appetite, no history of inpatient psychiatric treatment, linked to counseling services with an agency on [facility] David Costa, Counselor) and her next appointment is a week from today. Pt state her  and daughter are very supportive Gale Minus are my angels\". Pt report feeling \"embarrassed, mad and anxious\". Pt reportedly was prescribed klonopin approximately a week ago and state her anxiety symptoms have worsened. Pt is oriented x4, good insight, impaired judgment, circumstantial thought, poor eye contact \"seeing you guys wear a mask makes me more anxious, I stay away from places like this\". Pt endorse feelings of depression.          Level of Care Disposition:      1346  [facility], no time on [facility]. Consulted with Mj Urbina who requested Clinician fax a blank copy of [facility] for correction ([phone]). Toño faxed to [facility]. 1334 Pt paged as a level A.    1352  Pt paged as a level B.    1419  Consult with Giacomo Gonzalez PA-C who would like Clinician to consult with psychiatry. 1448  Call to Dr. Dea Bentley, no answer, left message requesting a return call. [address] with Dr. Dea Bentley who recommend discharge to follow up outpatient with Kat Callahan CNP and counselor. Giacomo Gonzalez PA-C and pt updated. EMC given to Giacomo Gonzalez PA-C to be lifted. Pt requested phone to call her  to pick her up. Telephone given.

## 2021-08-03 NOTE — PROGRESS NOTES
[facility] [address] [place] [address]  Dept: [phone]  Dept Fax: [phone]  Loc: [phone]    Visit Date: 8/3/2021      SUBJECTIVE DATA     CHIEF COMPLAINT:    Chief Complaint   Patient presents with    Anxiety    Depression    Follow-up       History obtained from: patient    HISTORY OF PRESENT ILLNESS:    Augusto Jones is a 61 y.o. female who presents via virtual video visit with complaints of anxiety and depression. She is here for follow-up. Her last visit was 07/28/2021. Patient states she is the \"Same as always\"  -now describes a \"humming\" in her head that she cannot get to stop  -chest tightness  -severe anxiety  -constant racing thoughts  -unable to stop the racing thoughts  -Very tired and worn out  -very irritable  -easily agitated    Continues to be upset and angry about the masking  -states it makes her very angry that masks are required at her place of employment    She is fearful of loosing her job    A co-worker recently committed suicide. No improvement with the Klonopin  -continues with a lot of anxiety  -continues with panic attacks    States \"it has been a screwed up couple of days\"    States she \"can barely leave the house\" due to the severe anxiety. Continues to feel down, sad, depressed  Endorses feeling worthless, hopeless, and helpless    Has an appointment made with her former therapist, Angi Castanon but unsure of the date or time. Refuses IOP. States \"One thing I'm not goning do is go to the hospital or do therapy or do that group thing. \"    States her anxiety is worsening again with all the masks being mandated by various corporations    States \"I'm at a critical stage\" regarding how she is feeling emotionally. Admits she is feeling aggressive and \"loosing that jefferson\" with regards to controlling her aggression and irritability. Admits to suicidal thoughts constantly.  These thoughts are constantly present and very difficult to control. States she is unable to stop the thoughts. Admits they are becoming more intrusive over time. When questioned about the suicidal ideations patient states \"I have to be very careful with what I tell you because now you are trying to trip me up. \" Denies the ability to stop the suicidal thoughts. Also denies the ability to distract self from the thoughts.  -states \"I can't see a future in me\"  -states \"it is a feeling of I want to stop dealing with it all. \"  -vague intent  -denies plan    States several times she does not want to go to the hospital    Stated she was agitated with this provider. States she is very frustrated with how she is feeling. Upset that \"nothing\" helps. States she doesn't want to do anything related to trauma therapy to process emotions. HPI    The patient has had 0 lifetime suicide attempts. Patient reports 0 psych hospital admissions     Past psychiatric medications include: paxil (didn't like how it made her feel; tried it for only a couple of weeks); hydroxyzine, Effexor, Abilify, BuSpar, Zoloft, Trazodone    Adverse reactions from psychotropic medications:  None currently      Current Psychiatric Review of Systems         Charisma or Hypomania:  no     Panic Attacks: As previously reported: yes - episodes last about 15 min; she reports fear, anxious, worry, needing to run or leave the situation urgently, feels as if she cannot swallow, feels like she is choking, chest pain, shakiness, \"bones are quivering and electrified\", feels like she is dying. Has fear of the attack happening again once it resolves. States \"this is a really bad feeling. \"     Phobias:  no     Obsessions and Compulsions:  no     Body or Vocal Tics:  no     Hallucinations:  no     Delusions:  no    SOCIAL HISTORY:  Patient was born in [place], [place] and raised by her biological parents      Social History     Socioeconomic History    Marital status:   Minutes of Exercise per Session:    Stress:     Feeling of Stress :    Social Connections:     Frequency of Communication with Friends and Family:     Frequency of Social Gatherings with Friends and Family:     Attends Jain Services:     Active Member of Clubs or Organizations:     Attends Club or Organization Meetings:     Marital Status:    Intimate Partner Violence:     Fear of Current or Ex-Partner:     Emotionally Abused:     Physically Abused:     Sexually Abused:        FAMILY HISTORY:   Family History   Problem Relation Age of Onset    Other Mother         Parkinson    Dementia Mother     Cancer Father         lung & brain    Alcohol Abuse Father     No Known Problems Brother        Psychiatric Family History  As noted above    PAST MEDICAL HISTORY:    History reviewed. No pertinent past medical history. PAST SURGICAL HISTORY:    Past Surgical History:   Procedure Laterality Date     SECTION   &  2002    x2    KNEE ARTHROSCOPY  2010    left    OTHER SURGICAL HISTORY  2019    had calcification/lump removed from her forehead    TONSILLECTOMY         PREVIOUSMEDICATIONS:  Outpatient Medications Prior to Visit   Medication Sig Dispense Refill    clonazePAM (KLONOPIN) 0.5 MG tablet Take 0.5 tablets by mouth 2 times daily as needed for Anxiety for up to 30 days. 30 tablet 0     No facility-administered medications prior to visit. ALLERGIES:    Patient has no known allergies. REVIEW OF SYSTEMS:    Review of Systems    The patient sees No primary care provider on file. as her primary care provider. SPECIALISTS: None    OBJECTIVE DATA     There were no vitals taken for this visit. Physical Exam    Mental Status Evaluation:   Orientation: Alert, oriented, thought content appropriate   Mood:.  Anxious and Depressed      Affect:  Increased in Intensity and Mood Congruent       Appearance:  Age Appropriate, Casually Dressed, Clean, Well Groomed, Clothing Appropriate for Age and Clothing Appropriate for Weather   Activity:  tearful; agitated, Restless & Fidgety and Poor Eye Contact   Gait/Posture: Normal   Speech:  Clear, Fluent, Normal Pitch and Volume, Age and Situation Appropriate   Thought Process: Within Normal Limits   Thought Content: Within Normal Limits   Cognition:  Grossly Intact   Memory: Intact   Insight:  Fair   Judgment: Fair   Suicidal Ideations: Active and Without Plan   Homicidal Ideations: Negative for homicidal ideation   Medication Side Effects: Absent       Attention Span Attention span and concentration were age appropriate       Screenings Completed in This Encounter:     Anxiety and Depression:                    DIAGNOSIS AND ASSESSMENT DATA     DIAGNOSIS:   1. Depression with suicidal ideation    2. Panic disorder without agoraphobia with severe panic attacks    3. PTSD (post-traumatic stress disorder)      R/O Personality disorder    PLAN   Follow-up:  No follow-ups on file. Follow-up will be pending evaluation/treatment from the behavioral health inpatient team    Prescriptions for this encounter:  New Prescriptions    No medications on file       No orders of the defined types were placed in this encounter. There are no discontinued medications. Additional orders:  No orders of the defined types were placed in this encounter. At last visit patient endorsed vague suicidal thoughts but denied intent or plan. At today's visit patient has reported these suicidal ideations to be more intrusive and she is unable to redirect her thinking. She has also reported increased irritability and agitation as well as some feelings of aggression. While she did not make a direct threat to harm this provider, she did exhibit aggressive posturing and an agitated state. She has limited insight and has made little progress with treatment.  Due to the increased intrusiveness of the suicidal thoughts and patient's vague intent as well as the

## 2021-08-03 NOTE — TELEPHONE ENCOUNTER
Cynthia Gutierrez, [facility] [employer], and Usha Workman from [employer], [phone], called with with questions regarding the Application For Emergency Admission. Romy Angel CNP and Dr. Dea Bentley were added to the conversation to clarify the the Application for Emergency Admission was written for the safety of the patient based on the conversations that occurred in the office appointment with Romy Angel, [address] [place] [address]. The patient's safety was of the utmost concern and the form was filled out and signed by the licensed APRN to evaluate the patient. It was to be acted upon with the safety of the patient and the police department in mind.

## 2021-08-04 NOTE — TELEPHONE ENCOUNTER
Message received via [employer]:    The discharge instructions I received from Earl Escobar PA-C was to inquire with Iliana Conroy if she wants to see me sooner than the existing appointment I have on Thursday, October 7 at 2:30 p.m. Also, I'm supposed to find out from Iliana Conroy what changes I'm supposed to make with the medication she prescribed me.

## 2021-08-04 NOTE — TELEPHONE ENCOUNTER
Offer her the 14:00 time on either Tues., Aug 17, 2021 or Thurs., Aug. 19, 2021. Regarding her medication, have her increase to Klonopin 0.5mg take 1 tab PO BID.

## 2021-08-05 NOTE — TELEPHONE ENCOUNTER
Prescription will be sent for Pristiq 25mg take 1 by mouth once daily. That Rx will go to [facility] as requested. The Deplin, which was recommended, is considered a medical food. It has to be sent to a speciality pharmacy. I will send the Rx.  That pharmacy will call patient to discuss insurance coverage/cost.

## 2021-08-06 NOTE — TELEPHONE ENCOUNTER
I have wrote back to Laurita Key via [employer] with this information along with if she had any additional questions or concerns prior to her 08/19/21 appt to contact the office.

## 2021-08-06 NOTE — TELEPHONE ENCOUNTER
Juwan Macedo wrote into the office via "[employer]":    Am I supposed to continue taking the clonazepam?    --Please advise.

## 2021-08-09 NOTE — TELEPHONE ENCOUNTER
Fide Frost wrote into the office via [employer]:    I have attached the latest mask mandate by my employer for my file.     Additionally, I would like to have a complete copy of my [employer] report mailed to me because I am only able to view one page of   12 in this [employer] orion. --Please advise any further. Mask mandate is attached under the patient's [employer] message. She is scheduled to return on 08/19/21.

## 2021-08-10 NOTE — TELEPHONE ENCOUNTER
I wrote back to Reece Gonsalves with this information via [employer]; I will await her response.

## 2021-08-10 NOTE — TELEPHONE ENCOUNTER
Reviewed the new policy patient sent. Is she willing/able to wear a shield?     Ok to send her the copy of the [employer] results per her request.
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
## 2021-09-16 NOTE — TELEPHONE ENCOUNTER
Rae is requesting a medication refill on Alycia's behalf for Pristiq 50mg;#30 with 0 refills; last with a start and refill date of 08/19/21. Medication is pending your approval for a 30 day supply with 0 refills; she is not scheduled to return until 09/22/21 leaving her just short of medication. She was last seen on 08/19/21.
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
## 2021-12-31 PROBLEM — U07.1 ACUTE RESPIRATORY FAILURE DUE TO COVID-19 (HCC): Status: ACTIVE | Noted: 2021-01-01

## 2021-12-31 PROBLEM — J96.00 ACUTE RESPIRATORY FAILURE DUE TO COVID-19 (HCC): Status: ACTIVE | Noted: 2021-01-01

## 2021-12-31 NOTE — ED TRIAGE NOTES
Pt comes to ED by [facility] from home with c/o SOB and chest pain. PT states that her  is COVID + and pt started having cough and congestion a 3 days ago.
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
## 2021-12-31 NOTE — ED NOTES
Bed: 020A  Expected date: 12/31/21  Expected time:   Means of arrival: [facility]  Comments:      Cyndy Alcaraz RN  12/31/21 6751

## 2022-01-01 ENCOUNTER — APPOINTMENT (OUTPATIENT)
Dept: GENERAL RADIOLOGY | Age: 57
DRG: 177 | End: 2022-01-01
Payer: COMMERCIAL

## 2022-01-01 ENCOUNTER — APPOINTMENT (OUTPATIENT)
Dept: CT IMAGING | Age: 57
DRG: 177 | End: 2022-01-01
Payer: COMMERCIAL

## 2022-01-01 VITALS
HEIGHT: 67 IN | SYSTOLIC BLOOD PRESSURE: 69 MMHG | RESPIRATION RATE: 35 BRPM | OXYGEN SATURATION: 79 % | WEIGHT: 179 LBS | HEART RATE: 129 BPM | TEMPERATURE: 98 F | BODY MASS INDEX: 28.09 KG/M2 | DIASTOLIC BLOOD PRESSURE: 46 MMHG

## 2022-01-01 LAB
ANION GAP SERPL CALCULATED.3IONS-SCNC: 10 MEQ/L (ref 8–16)
ANION GAP SERPL CALCULATED.3IONS-SCNC: 11 MEQ/L (ref 8–16)
ANION GAP SERPL CALCULATED.3IONS-SCNC: 12 MEQ/L (ref 8–16)
ANION GAP SERPL CALCULATED.3IONS-SCNC: 13 MEQ/L (ref 8–16)
ANION GAP SERPL CALCULATED.3IONS-SCNC: 14 MEQ/L (ref 8–16)
ANION GAP SERPL CALCULATED.3IONS-SCNC: 18 MEQ/L (ref 8–16)
ANION GAP SERPL CALCULATED.3IONS-SCNC: 19 MEQ/L (ref 8–16)
ANION GAP SERPL CALCULATED.3IONS-SCNC: 7 MEQ/L (ref 8–16)
APTT: 27.6 SECONDS (ref 22–38)
ATYPICAL LYMPHOCYTES: ABNORMAL %
ATYPICAL LYMPHOCYTES: ABNORMAL %
AVERAGE GLUCOSE: 105 MG/DL (ref 70–126)
BASOPHILS # BLD: 0 %
BASOPHILS # BLD: 0.1 %
BASOPHILS # BLD: 0.2 %
BASOPHILS # BLD: 0.3 %
BASOPHILS ABSOLUTE: 0 THOU/MM3 (ref 0–0.1)
BORDETELLA PARAPERTUSSIS BY PCR: NOT DETECTED
BORDETELLA PERTUSSIS BY PCR: NOT DETECTED
BUN BLDV-MCNC: 10 MG/DL (ref 7–22)
BUN BLDV-MCNC: 11 MG/DL (ref 7–22)
BUN BLDV-MCNC: 12 MG/DL (ref 7–22)
BUN BLDV-MCNC: 14 MG/DL (ref 7–22)
BUN BLDV-MCNC: 14 MG/DL (ref 7–22)
BUN BLDV-MCNC: 9 MG/DL (ref 7–22)
C-REACTIVE PROTEIN: 1.31 MG/DL (ref 0–1)
C-REACTIVE PROTEIN: 1.39 MG/DL (ref 0–1)
C-REACTIVE PROTEIN: 2.73 MG/DL (ref 0–1)
C-REACTIVE PROTEIN: 5.87 MG/DL (ref 0–1)
C-REACTIVE PROTEIN: 8.4 MG/DL (ref 0–1)
C-REACTIVE PROTEIN: 9.03 MG/DL (ref 0–1)
CALCIUM SERPL-MCNC: 7.9 MG/DL (ref 8.5–10.5)
CALCIUM SERPL-MCNC: 8 MG/DL (ref 8.5–10.5)
CALCIUM SERPL-MCNC: 8.1 MG/DL (ref 8.5–10.5)
CALCIUM SERPL-MCNC: 8.2 MG/DL (ref 8.5–10.5)
CALCIUM SERPL-MCNC: 8.5 MG/DL (ref 8.5–10.5)
CALCIUM SERPL-MCNC: 8.5 MG/DL (ref 8.5–10.5)
CHLORIDE BLD-SCNC: 100 MEQ/L (ref 98–111)
CHLORIDE BLD-SCNC: 101 MEQ/L (ref 98–111)
CHLORIDE BLD-SCNC: 102 MEQ/L (ref 98–111)
CHLORIDE BLD-SCNC: 103 MEQ/L (ref 98–111)
CHLORIDE BLD-SCNC: 103 MEQ/L (ref 98–111)
CHLORIDE BLD-SCNC: 104 MEQ/L (ref 98–111)
CHLORIDE BLD-SCNC: 105 MEQ/L (ref 98–111)
CHLORIDE BLD-SCNC: 97 MEQ/L (ref 98–111)
CHLORIDE BLD-SCNC: 99 MEQ/L (ref 98–111)
CO2: 18 MEQ/L (ref 23–33)
CO2: 23 MEQ/L (ref 23–33)
CO2: 24 MEQ/L (ref 23–33)
CO2: 25 MEQ/L (ref 23–33)
CO2: 26 MEQ/L (ref 23–33)
CO2: 29 MEQ/L (ref 23–33)
CREAT SERPL-MCNC: 0.4 MG/DL (ref 0.4–1.2)
CREAT SERPL-MCNC: 0.5 MG/DL (ref 0.4–1.2)
CREAT SERPL-MCNC: 0.6 MG/DL (ref 0.4–1.2)
CREAT SERPL-MCNC: 0.7 MG/DL (ref 0.4–1.2)
CREAT SERPL-MCNC: 0.8 MG/DL (ref 0.4–1.2)
DOHLE BODIES: PRESENT
EKG ATRIAL RATE: 113 BPM
EKG ATRIAL RATE: 138 BPM
EKG P AXIS: 59 DEGREES
EKG P AXIS: 67 DEGREES
EKG P-R INTERVAL: 122 MS
EKG P-R INTERVAL: 140 MS
EKG Q-T INTERVAL: 288 MS
EKG Q-T INTERVAL: 332 MS
EKG QRS DURATION: 72 MS
EKG QRS DURATION: 74 MS
EKG QTC CALCULATION (BAZETT): 436 MS
EKG QTC CALCULATION (BAZETT): 455 MS
EKG R AXIS: 14 DEGREES
EKG R AXIS: 27 DEGREES
EKG T AXIS: 46 DEGREES
EKG T AXIS: 58 DEGREES
EKG VENTRICULAR RATE: 113 BPM
EKG VENTRICULAR RATE: 138 BPM
EOSINOPHIL # BLD: 0 %
EOSINOPHIL # BLD: 0.1 %
EOSINOPHILS ABSOLUTE: 0 THOU/MM3 (ref 0–0.4)
ERYTHROCYTE [DISTWIDTH] IN BLOOD BY AUTOMATED COUNT: 13.6 % (ref 11.5–14.5)
ERYTHROCYTE [DISTWIDTH] IN BLOOD BY AUTOMATED COUNT: 13.7 % (ref 11.5–14.5)
ERYTHROCYTE [DISTWIDTH] IN BLOOD BY AUTOMATED COUNT: 13.7 % (ref 11.5–14.5)
ERYTHROCYTE [DISTWIDTH] IN BLOOD BY AUTOMATED COUNT: 13.8 % (ref 11.5–14.5)
ERYTHROCYTE [DISTWIDTH] IN BLOOD BY AUTOMATED COUNT: 13.9 % (ref 11.5–14.5)
ERYTHROCYTE [DISTWIDTH] IN BLOOD BY AUTOMATED COUNT: 14.1 % (ref 11.5–14.5)
ERYTHROCYTE [DISTWIDTH] IN BLOOD BY AUTOMATED COUNT: 14.1 % (ref 11.5–14.5)
ERYTHROCYTE [DISTWIDTH] IN BLOOD BY AUTOMATED COUNT: 14.4 % (ref 11.5–14.5)
ERYTHROCYTE [DISTWIDTH] IN BLOOD BY AUTOMATED COUNT: 14.6 % (ref 11.5–14.5)
ERYTHROCYTE [DISTWIDTH] IN BLOOD BY AUTOMATED COUNT: 14.7 % (ref 11.5–14.5)
ERYTHROCYTE [DISTWIDTH] IN BLOOD BY AUTOMATED COUNT: 46.5 FL (ref 35–45)
ERYTHROCYTE [DISTWIDTH] IN BLOOD BY AUTOMATED COUNT: 46.8 FL (ref 35–45)
ERYTHROCYTE [DISTWIDTH] IN BLOOD BY AUTOMATED COUNT: 46.9 FL (ref 35–45)
ERYTHROCYTE [DISTWIDTH] IN BLOOD BY AUTOMATED COUNT: 47 FL (ref 35–45)
ERYTHROCYTE [DISTWIDTH] IN BLOOD BY AUTOMATED COUNT: 47.1 FL (ref 35–45)
ERYTHROCYTE [DISTWIDTH] IN BLOOD BY AUTOMATED COUNT: 47.2 FL (ref 35–45)
ERYTHROCYTE [DISTWIDTH] IN BLOOD BY AUTOMATED COUNT: 47.7 FL (ref 35–45)
ERYTHROCYTE [DISTWIDTH] IN BLOOD BY AUTOMATED COUNT: 48 FL (ref 35–45)
ERYTHROCYTE [DISTWIDTH] IN BLOOD BY AUTOMATED COUNT: 48 FL (ref 35–45)
ERYTHROCYTE [DISTWIDTH] IN BLOOD BY AUTOMATED COUNT: 49.9 FL (ref 35–45)
ERYTHROCYTE [DISTWIDTH] IN BLOOD BY AUTOMATED COUNT: 50.2 FL (ref 35–45)
ERYTHROCYTE [DISTWIDTH] IN BLOOD BY AUTOMATED COUNT: 51 FL (ref 35–45)
ERYTHROCYTE [DISTWIDTH] IN BLOOD BY AUTOMATED COUNT: 51.1 FL (ref 35–45)
ERYTHROCYTE [DISTWIDTH] IN BLOOD BY AUTOMATED COUNT: 51.2 FL (ref 35–45)
FILM ARRAY ADENOVIRUS: NOT DETECTED
FILM ARRAY CHLAMYDOPHILIA PNEUMONIAE: NOT DETECTED
FILM ARRAY CORONAVIRUS 229E: NOT DETECTED
FILM ARRAY CORONAVIRUS HKU1: NOT DETECTED
FILM ARRAY CORONAVIRUS NL63: NOT DETECTED
FILM ARRAY CORONAVIRUS OC43: NOT DETECTED
FILM ARRAY INFLUENZA A VIRUS: NOT DETECTED
FILM ARRAY INFLUENZA B: NOT DETECTED
FILM ARRAY METAPNEUMOVIRUS: NOT DETECTED
FILM ARRAY MYCOPLASMA PNEUMONIAE: NOT DETECTED
FILM ARRAY PARAINFLUENZA VIRUS 1: NOT DETECTED
FILM ARRAY PARAINFLUENZA VIRUS 2: NOT DETECTED
FILM ARRAY PARAINFLUENZA VIRUS 3: NOT DETECTED
FILM ARRAY PARAINFLUENZA VIRUS 4: NOT DETECTED
FILM ARRAY RESPIRATORY SYNCITIAL VIRUS: NOT DETECTED
FILM ARRAY RHINOVIRUS/ENTEROVIRUS: NOT DETECTED
GFR SERPL CREATININE-BSD FRML MDRD: 74 ML/MIN/1.73M2
GFR SERPL CREATININE-BSD FRML MDRD: 86 ML/MIN/1.73M2
GFR SERPL CREATININE-BSD FRML MDRD: > 90 ML/MIN/1.73M2
GLUCOSE BLD-MCNC: 101 MG/DL (ref 70–108)
GLUCOSE BLD-MCNC: 101 MG/DL (ref 70–108)
GLUCOSE BLD-MCNC: 103 MG/DL (ref 70–108)
GLUCOSE BLD-MCNC: 104 MG/DL (ref 70–108)
GLUCOSE BLD-MCNC: 104 MG/DL (ref 70–108)
GLUCOSE BLD-MCNC: 106 MG/DL (ref 70–108)
GLUCOSE BLD-MCNC: 106 MG/DL (ref 70–108)
GLUCOSE BLD-MCNC: 107 MG/DL (ref 70–108)
GLUCOSE BLD-MCNC: 108 MG/DL (ref 70–108)
GLUCOSE BLD-MCNC: 110 MG/DL (ref 70–108)
GLUCOSE BLD-MCNC: 111 MG/DL (ref 70–108)
GLUCOSE BLD-MCNC: 112 MG/DL (ref 70–108)
GLUCOSE BLD-MCNC: 113 MG/DL (ref 70–108)
GLUCOSE BLD-MCNC: 113 MG/DL (ref 70–108)
GLUCOSE BLD-MCNC: 114 MG/DL (ref 70–108)
GLUCOSE BLD-MCNC: 115 MG/DL (ref 70–108)
GLUCOSE BLD-MCNC: 115 MG/DL (ref 70–108)
GLUCOSE BLD-MCNC: 116 MG/DL (ref 70–108)
GLUCOSE BLD-MCNC: 117 MG/DL (ref 70–108)
GLUCOSE BLD-MCNC: 118 MG/DL (ref 70–108)
GLUCOSE BLD-MCNC: 120 MG/DL (ref 70–108)
GLUCOSE BLD-MCNC: 122 MG/DL (ref 70–108)
GLUCOSE BLD-MCNC: 122 MG/DL (ref 70–108)
GLUCOSE BLD-MCNC: 124 MG/DL (ref 70–108)
GLUCOSE BLD-MCNC: 125 MG/DL (ref 70–108)
GLUCOSE BLD-MCNC: 128 MG/DL (ref 70–108)
GLUCOSE BLD-MCNC: 129 MG/DL (ref 70–108)
GLUCOSE BLD-MCNC: 130 MG/DL (ref 70–108)
GLUCOSE BLD-MCNC: 131 MG/DL (ref 70–108)
GLUCOSE BLD-MCNC: 133 MG/DL (ref 70–108)
GLUCOSE BLD-MCNC: 134 MG/DL (ref 70–108)
GLUCOSE BLD-MCNC: 134 MG/DL (ref 70–108)
GLUCOSE BLD-MCNC: 135 MG/DL (ref 70–108)
GLUCOSE BLD-MCNC: 137 MG/DL (ref 70–108)
GLUCOSE BLD-MCNC: 139 MG/DL (ref 70–108)
GLUCOSE BLD-MCNC: 141 MG/DL (ref 70–108)
GLUCOSE BLD-MCNC: 141 MG/DL (ref 70–108)
GLUCOSE BLD-MCNC: 145 MG/DL (ref 70–108)
GLUCOSE BLD-MCNC: 154 MG/DL (ref 70–108)
GLUCOSE BLD-MCNC: 155 MG/DL (ref 70–108)
GLUCOSE BLD-MCNC: 157 MG/DL (ref 70–108)
GLUCOSE BLD-MCNC: 158 MG/DL (ref 70–108)
GLUCOSE BLD-MCNC: 161 MG/DL (ref 70–108)
GLUCOSE BLD-MCNC: 163 MG/DL (ref 70–108)
GLUCOSE BLD-MCNC: 174 MG/DL (ref 70–108)
GLUCOSE BLD-MCNC: 175 MG/DL (ref 70–108)
GLUCOSE BLD-MCNC: 183 MG/DL (ref 70–108)
GLUCOSE BLD-MCNC: 228 MG/DL (ref 70–108)
GLUCOSE BLD-MCNC: 79 MG/DL (ref 70–108)
GLUCOSE BLD-MCNC: 81 MG/DL (ref 70–108)
GLUCOSE BLD-MCNC: 81 MG/DL (ref 70–108)
GLUCOSE BLD-MCNC: 87 MG/DL (ref 70–108)
GLUCOSE BLD-MCNC: 88 MG/DL (ref 70–108)
GLUCOSE BLD-MCNC: 90 MG/DL (ref 70–108)
GLUCOSE BLD-MCNC: 94 MG/DL (ref 70–108)
GLUCOSE BLD-MCNC: 95 MG/DL (ref 70–108)
GLUCOSE BLD-MCNC: 96 MG/DL (ref 70–108)
GLUCOSE BLD-MCNC: 99 MG/DL (ref 70–108)
HBA1C MFR BLD: 5.5 % (ref 4.4–6.4)
HCT VFR BLD CALC: 35.3 % (ref 37–47)
HCT VFR BLD CALC: 36.8 % (ref 37–47)
HCT VFR BLD CALC: 36.8 % (ref 37–47)
HCT VFR BLD CALC: 37 % (ref 37–47)
HCT VFR BLD CALC: 37.8 % (ref 37–47)
HCT VFR BLD CALC: 38.4 % (ref 37–47)
HCT VFR BLD CALC: 39 % (ref 37–47)
HCT VFR BLD CALC: 39.5 % (ref 37–47)
HCT VFR BLD CALC: 40.3 % (ref 37–47)
HCT VFR BLD CALC: 41.2 % (ref 37–47)
HCT VFR BLD CALC: 41.2 % (ref 37–47)
HCT VFR BLD CALC: 41.7 % (ref 37–47)
HCT VFR BLD CALC: 42.3 % (ref 37–47)
HCT VFR BLD CALC: 44.3 % (ref 37–47)
HEMOGLOBIN: 11.2 GM/DL (ref 12–16)
HEMOGLOBIN: 11.7 GM/DL (ref 12–16)
HEMOGLOBIN: 11.9 GM/DL (ref 12–16)
HEMOGLOBIN: 11.9 GM/DL (ref 12–16)
HEMOGLOBIN: 12.2 GM/DL (ref 12–16)
HEMOGLOBIN: 12.2 GM/DL (ref 12–16)
HEMOGLOBIN: 12.7 GM/DL (ref 12–16)
HEMOGLOBIN: 12.7 GM/DL (ref 12–16)
HEMOGLOBIN: 12.8 GM/DL (ref 12–16)
HEMOGLOBIN: 13.2 GM/DL (ref 12–16)
HEMOGLOBIN: 13.3 GM/DL (ref 12–16)
HEMOGLOBIN: 13.5 GM/DL (ref 12–16)
HEMOGLOBIN: 13.7 GM/DL (ref 12–16)
HEMOGLOBIN: 14.4 GM/DL (ref 12–16)
HEPARIN INDUCED PLATELET ANTIBODY: NORMAL
IMMATURE GRANS (ABS): 0.01 THOU/MM3 (ref 0–0.07)
IMMATURE GRANS (ABS): 0.02 THOU/MM3 (ref 0–0.07)
IMMATURE GRANS (ABS): 0.03 THOU/MM3 (ref 0–0.07)
IMMATURE GRANS (ABS): 0.03 THOU/MM3 (ref 0–0.07)
IMMATURE GRANS (ABS): 0.04 THOU/MM3 (ref 0–0.07)
IMMATURE GRANS (ABS): 0.04 THOU/MM3 (ref 0–0.07)
IMMATURE GRANS (ABS): 0.05 THOU/MM3 (ref 0–0.07)
IMMATURE GRANS (ABS): 0.06 THOU/MM3 (ref 0–0.07)
IMMATURE GRANS (ABS): 0.08 THOU/MM3 (ref 0–0.07)
IMMATURE GRANS (ABS): 0.09 THOU/MM3 (ref 0–0.07)
IMMATURE GRANS (ABS): 0.45 THOU/MM3 (ref 0–0.07)
IMMATURE GRANULOCYTES: 0.5 %
IMMATURE GRANULOCYTES: 0.5 %
IMMATURE GRANULOCYTES: 0.6 %
IMMATURE GRANULOCYTES: 0.7 %
IMMATURE GRANULOCYTES: 0.8 %
IMMATURE GRANULOCYTES: 0.9 %
IMMATURE GRANULOCYTES: 1 %
IMMATURE GRANULOCYTES: 2.3 %
LEGIONELLA PNEUMOPHILIA AG, URINE: NEGATIVE
LYMPHOCYTES # BLD: 14.3 %
LYMPHOCYTES # BLD: 16.1 %
LYMPHOCYTES # BLD: 16.4 %
LYMPHOCYTES # BLD: 18.4 %
LYMPHOCYTES # BLD: 19.2 %
LYMPHOCYTES # BLD: 2.8 %
LYMPHOCYTES # BLD: 20.3 %
LYMPHOCYTES # BLD: 26.9 %
LYMPHOCYTES # BLD: 27.5 %
LYMPHOCYTES # BLD: 4.2 %
LYMPHOCYTES # BLD: 4.5 %
LYMPHOCYTES # BLD: 5.6 %
LYMPHOCYTES # BLD: 7.8 %
LYMPHOCYTES # BLD: 8.3 %
LYMPHOCYTES ABSOLUTE: 0.4 THOU/MM3 (ref 1–4.8)
LYMPHOCYTES ABSOLUTE: 0.5 THOU/MM3 (ref 1–4.8)
LYMPHOCYTES ABSOLUTE: 0.6 THOU/MM3 (ref 1–4.8)
LYMPHOCYTES ABSOLUTE: 0.7 THOU/MM3 (ref 1–4.8)
LYMPHOCYTES ABSOLUTE: 0.7 THOU/MM3 (ref 1–4.8)
LYMPHOCYTES ABSOLUTE: 0.9 THOU/MM3 (ref 1–4.8)
MCH RBC QN AUTO: 29.8 PG (ref 26–33)
MCH RBC QN AUTO: 29.8 PG (ref 26–33)
MCH RBC QN AUTO: 30 PG (ref 26–33)
MCH RBC QN AUTO: 30 PG (ref 26–33)
MCH RBC QN AUTO: 30.1 PG (ref 26–33)
MCH RBC QN AUTO: 30.1 PG (ref 26–33)
MCH RBC QN AUTO: 30.2 PG (ref 26–33)
MCH RBC QN AUTO: 30.2 PG (ref 26–33)
MCH RBC QN AUTO: 30.4 PG (ref 26–33)
MCH RBC QN AUTO: 30.4 PG (ref 26–33)
MCH RBC QN AUTO: 30.5 PG (ref 26–33)
MCH RBC QN AUTO: 30.7 PG (ref 26–33)
MCH RBC QN AUTO: 30.8 PG (ref 26–33)
MCH RBC QN AUTO: 31 PG (ref 26–33)
MCHC RBC AUTO-ENTMCNC: 31.5 GM/DL (ref 32.2–35.5)
MCHC RBC AUTO-ENTMCNC: 31.7 GM/DL (ref 32.2–35.5)
MCHC RBC AUTO-ENTMCNC: 31.8 GM/DL (ref 32.2–35.5)
MCHC RBC AUTO-ENTMCNC: 31.9 GM/DL (ref 32.2–35.5)
MCHC RBC AUTO-ENTMCNC: 32 GM/DL (ref 32.2–35.5)
MCHC RBC AUTO-ENTMCNC: 32.2 GM/DL (ref 32.2–35.5)
MCHC RBC AUTO-ENTMCNC: 32.3 GM/DL (ref 32.2–35.5)
MCHC RBC AUTO-ENTMCNC: 32.4 GM/DL (ref 32.2–35.5)
MCHC RBC AUTO-ENTMCNC: 32.5 GM/DL (ref 32.2–35.5)
MCHC RBC AUTO-ENTMCNC: 32.6 GM/DL (ref 32.2–35.5)
MCHC RBC AUTO-ENTMCNC: 32.8 GM/DL (ref 32.2–35.5)
MCHC RBC AUTO-ENTMCNC: 33 GM/DL (ref 32.2–35.5)
MCV RBC AUTO: 92.2 FL (ref 81–99)
MCV RBC AUTO: 92.9 FL (ref 81–99)
MCV RBC AUTO: 93 FL (ref 81–99)
MCV RBC AUTO: 93.8 FL (ref 81–99)
MCV RBC AUTO: 93.8 FL (ref 81–99)
MCV RBC AUTO: 93.9 FL (ref 81–99)
MCV RBC AUTO: 94 FL (ref 81–99)
MCV RBC AUTO: 94.1 FL (ref 81–99)
MCV RBC AUTO: 94.3 FL (ref 81–99)
MCV RBC AUTO: 94.5 FL (ref 81–99)
MCV RBC AUTO: 94.9 FL (ref 81–99)
MCV RBC AUTO: 95.3 FL (ref 81–99)
MCV RBC AUTO: 95.7 FL (ref 81–99)
MCV RBC AUTO: 96.7 FL (ref 81–99)
MONOCYTES # BLD: 1.5 %
MONOCYTES # BLD: 1.5 %
MONOCYTES # BLD: 1.6 %
MONOCYTES # BLD: 1.8 %
MONOCYTES # BLD: 1.9 %
MONOCYTES # BLD: 2 %
MONOCYTES # BLD: 2 %
MONOCYTES # BLD: 2.2 %
MONOCYTES # BLD: 2.2 %
MONOCYTES # BLD: 2.3 %
MONOCYTES # BLD: 2.5 %
MONOCYTES # BLD: 2.8 %
MONOCYTES # BLD: 3.1 %
MONOCYTES # BLD: 3.3 %
MONOCYTES ABSOLUTE: 0 THOU/MM3 (ref 0.4–1.3)
MONOCYTES ABSOLUTE: 0.1 THOU/MM3 (ref 0.4–1.3)
MONOCYTES ABSOLUTE: 0.2 THOU/MM3 (ref 0.4–1.3)
MONOCYTES ABSOLUTE: 0.5 THOU/MM3 (ref 0.4–1.3)
NUCLEATED RED BLOOD CELLS: 0 /100 WBC
PLATELET # BLD: 103 THOU/MM3 (ref 130–400)
PLATELET # BLD: 103 THOU/MM3 (ref 130–400)
PLATELET # BLD: 116 THOU/MM3 (ref 130–400)
PLATELET # BLD: 118 THOU/MM3 (ref 130–400)
PLATELET # BLD: 119 THOU/MM3 (ref 130–400)
PLATELET # BLD: 127 THOU/MM3 (ref 130–400)
PLATELET # BLD: 134 THOU/MM3 (ref 130–400)
PLATELET # BLD: 142 THOU/MM3 (ref 130–400)
PLATELET # BLD: 142 THOU/MM3 (ref 130–400)
PLATELET # BLD: 151 THOU/MM3 (ref 130–400)
PLATELET # BLD: 65 THOU/MM3 (ref 130–400)
PLATELET # BLD: 71 THOU/MM3 (ref 130–400)
PLATELET # BLD: 79 THOU/MM3 (ref 130–400)
PLATELET # BLD: 90 THOU/MM3 (ref 130–400)
PLATELET ESTIMATE: ABNORMAL
PLATELET ESTIMATE: ADEQUATE
PMV BLD AUTO: 10.1 FL (ref 9.4–12.4)
PMV BLD AUTO: 10.1 FL (ref 9.4–12.4)
PMV BLD AUTO: 10.8 FL (ref 9.4–12.4)
PMV BLD AUTO: 11.8 FL (ref 9.4–12.4)
PMV BLD AUTO: 11.9 FL (ref 9.4–12.4)
PMV BLD AUTO: 12 FL (ref 9.4–12.4)
PMV BLD AUTO: 12 FL (ref 9.4–12.4)
PMV BLD AUTO: 9 FL (ref 9.4–12.4)
PMV BLD AUTO: 9.2 FL (ref 9.4–12.4)
PMV BLD AUTO: 9.2 FL (ref 9.4–12.4)
PMV BLD AUTO: 9.4 FL (ref 9.4–12.4)
PMV BLD AUTO: 9.4 FL (ref 9.4–12.4)
PMV BLD AUTO: 9.7 FL (ref 9.4–12.4)
PMV BLD AUTO: 9.8 FL (ref 9.4–12.4)
POTASSIUM REFLEX MAGNESIUM: 3.6 MEQ/L (ref 3.5–5.2)
POTASSIUM REFLEX MAGNESIUM: 3.9 MEQ/L (ref 3.5–5.2)
POTASSIUM REFLEX MAGNESIUM: 4 MEQ/L (ref 3.5–5.2)
POTASSIUM REFLEX MAGNESIUM: 4.1 MEQ/L (ref 3.5–5.2)
POTASSIUM REFLEX MAGNESIUM: 4.3 MEQ/L (ref 3.5–5.2)
POTASSIUM REFLEX MAGNESIUM: 4.3 MEQ/L (ref 3.5–5.2)
POTASSIUM REFLEX MAGNESIUM: 4.5 MEQ/L (ref 3.5–5.2)
POTASSIUM SERPL-SCNC: 3.6 MEQ/L (ref 3.5–5.2)
POTASSIUM SERPL-SCNC: 3.7 MEQ/L (ref 3.5–5.2)
POTASSIUM SERPL-SCNC: 3.8 MEQ/L (ref 3.5–5.2)
POTASSIUM SERPL-SCNC: 3.8 MEQ/L (ref 3.5–5.2)
POTASSIUM SERPL-SCNC: 3.9 MEQ/L (ref 3.5–5.2)
POTASSIUM SERPL-SCNC: 4 MEQ/L (ref 3.5–5.2)
POTASSIUM SERPL-SCNC: 4.2 MEQ/L (ref 3.5–5.2)
PROCALCITONIN: 0.17 NG/ML (ref 0.01–0.09)
RBC # BLD: 3.72 MILL/MM3 (ref 4.2–5.4)
RBC # BLD: 3.86 MILL/MM3 (ref 4.2–5.4)
RBC # BLD: 3.91 MILL/MM3 (ref 4.2–5.4)
RBC # BLD: 3.92 MILL/MM3 (ref 4.2–5.4)
RBC # BLD: 3.98 MILL/MM3 (ref 4.2–5.4)
RBC # BLD: 4.07 MILL/MM3 (ref 4.2–5.4)
RBC # BLD: 4.16 MILL/MM3 (ref 4.2–5.4)
RBC # BLD: 4.2 MILL/MM3 (ref 4.2–5.4)
RBC # BLD: 4.21 MILL/MM3 (ref 4.2–5.4)
RBC # BLD: 4.36 MILL/MM3 (ref 4.2–5.4)
RBC # BLD: 4.39 MILL/MM3 (ref 4.2–5.4)
RBC # BLD: 4.43 MILL/MM3 (ref 4.2–5.4)
RBC # BLD: 4.59 MILL/MM3 (ref 4.2–5.4)
RBC # BLD: 4.77 MILL/MM3 (ref 4.2–5.4)
REASON FOR REJECTION: NORMAL
REJECTED TEST: NORMAL
SARS-COV-2, PCR: DETECTED
SCAN OF BLOOD SMEAR: NORMAL
SEG NEUTROPHILS: 69.3 %
SEG NEUTROPHILS: 70.4 %
SEG NEUTROPHILS: 76.7 %
SEG NEUTROPHILS: 77.2 %
SEG NEUTROPHILS: 79.1 %
SEG NEUTROPHILS: 80.8 %
SEG NEUTROPHILS: 81.5 %
SEG NEUTROPHILS: 82.3 %
SEG NEUTROPHILS: 88.8 %
SEG NEUTROPHILS: 89.3 %
SEG NEUTROPHILS: 91.2 %
SEG NEUTROPHILS: 92.3 %
SEG NEUTROPHILS: 92.5 %
SEG NEUTROPHILS: 93 %
SEGMENTED NEUTROPHILS ABSOLUTE COUNT: 1.5 THOU/MM3 (ref 1.8–7.7)
SEGMENTED NEUTROPHILS ABSOLUTE COUNT: 10 THOU/MM3 (ref 1.8–7.7)
SEGMENTED NEUTROPHILS ABSOLUTE COUNT: 18.4 THOU/MM3 (ref 1.8–7.7)
SEGMENTED NEUTROPHILS ABSOLUTE COUNT: 2.2 THOU/MM3 (ref 1.8–7.7)
SEGMENTED NEUTROPHILS ABSOLUTE COUNT: 2.3 THOU/MM3 (ref 1.8–7.7)
SEGMENTED NEUTROPHILS ABSOLUTE COUNT: 3 THOU/MM3 (ref 1.8–7.7)
SEGMENTED NEUTROPHILS ABSOLUTE COUNT: 3.1 THOU/MM3 (ref 1.8–7.7)
SEGMENTED NEUTROPHILS ABSOLUTE COUNT: 3.3 THOU/MM3 (ref 1.8–7.7)
SEGMENTED NEUTROPHILS ABSOLUTE COUNT: 3.4 THOU/MM3 (ref 1.8–7.7)
SEGMENTED NEUTROPHILS ABSOLUTE COUNT: 4.4 THOU/MM3 (ref 1.8–7.7)
SEGMENTED NEUTROPHILS ABSOLUTE COUNT: 5.1 THOU/MM3 (ref 1.8–7.7)
SEGMENTED NEUTROPHILS ABSOLUTE COUNT: 6.1 THOU/MM3 (ref 1.8–7.7)
SEGMENTED NEUTROPHILS ABSOLUTE COUNT: 8.6 THOU/MM3 (ref 1.8–7.7)
SEGMENTED NEUTROPHILS ABSOLUTE COUNT: 9.9 THOU/MM3 (ref 1.8–7.7)
SODIUM BLD-SCNC: 132 MEQ/L (ref 135–145)
SODIUM BLD-SCNC: 136 MEQ/L (ref 135–145)
SODIUM BLD-SCNC: 137 MEQ/L (ref 135–145)
SODIUM BLD-SCNC: 138 MEQ/L (ref 135–145)
SODIUM BLD-SCNC: 139 MEQ/L (ref 135–145)
SODIUM BLD-SCNC: 140 MEQ/L (ref 135–145)
SODIUM BLD-SCNC: 141 MEQ/L (ref 135–145)
SODIUM BLD-SCNC: 144 MEQ/L (ref 135–145)
STREP PNEUMO AG, UR: NEGATIVE
TOTAL CK: 1410 U/L (ref 30–135)
TOTAL CK: 898 U/L (ref 30–135)
TOXIC GRANULATION: PRESENT
WBC # BLD: 10.7 THOU/MM3 (ref 4.8–10.8)
WBC # BLD: 10.7 THOU/MM3 (ref 4.8–10.8)
WBC # BLD: 19.9 THOU/MM3 (ref 4.8–10.8)
WBC # BLD: 2.1 THOU/MM3 (ref 4.8–10.8)
WBC # BLD: 2.9 THOU/MM3 (ref 4.8–10.8)
WBC # BLD: 3.3 THOU/MM3 (ref 4.8–10.8)
WBC # BLD: 3.6 THOU/MM3 (ref 4.8–10.8)
WBC # BLD: 3.9 THOU/MM3 (ref 4.8–10.8)
WBC # BLD: 4.2 THOU/MM3 (ref 4.8–10.8)
WBC # BLD: 4.3 THOU/MM3 (ref 4.8–10.8)
WBC # BLD: 5.4 THOU/MM3 (ref 4.8–10.8)
WBC # BLD: 5.7 THOU/MM3 (ref 4.8–10.8)
WBC # BLD: 6.8 THOU/MM3 (ref 4.8–10.8)
WBC # BLD: 9.4 THOU/MM3 (ref 4.8–10.8)

## 2022-01-01 PROCEDURE — 82948 REAGENT STRIP/BLOOD GLUCOSE: CPT

## 2022-01-01 PROCEDURE — 94761 N-INVAS EAR/PLS OXIMETRY MLT: CPT

## 2022-01-01 PROCEDURE — 6360000002 HC RX W HCPCS: Performed by: STUDENT IN AN ORGANIZED HEALTH CARE EDUCATION/TRAINING PROGRAM

## 2022-01-01 PROCEDURE — 99233 SBSQ HOSP IP/OBS HIGH 50: CPT | Performed by: INTERNAL MEDICINE

## 2022-01-01 PROCEDURE — 86140 C-REACTIVE PROTEIN: CPT

## 2022-01-01 PROCEDURE — 2060000000 HC ICU INTERMEDIATE R&B

## 2022-01-01 PROCEDURE — 6360000002 HC RX W HCPCS: Performed by: PHYSICIAN ASSISTANT

## 2022-01-01 PROCEDURE — 85025 COMPLETE CBC W/AUTO DIFF WBC: CPT

## 2022-01-01 PROCEDURE — 36415 COLL VENOUS BLD VENIPUNCTURE: CPT

## 2022-01-01 PROCEDURE — 80048 BASIC METABOLIC PNL TOTAL CA: CPT

## 2022-01-01 PROCEDURE — 99232 SBSQ HOSP IP/OBS MODERATE 35: CPT | Performed by: FAMILY MEDICINE

## 2022-01-01 PROCEDURE — 71045 X-RAY EXAM CHEST 1 VIEW: CPT

## 2022-01-01 PROCEDURE — 6370000000 HC RX 637 (ALT 250 FOR IP): Performed by: FAMILY MEDICINE

## 2022-01-01 PROCEDURE — 6360000002 HC RX W HCPCS: Performed by: HOSPITALIST

## 2022-01-01 PROCEDURE — 6360000002 HC RX W HCPCS: Performed by: INTERNAL MEDICINE

## 2022-01-01 PROCEDURE — 2700000000 HC OXYGEN THERAPY PER DAY

## 2022-01-01 PROCEDURE — 82550 ASSAY OF CK (CPK): CPT

## 2022-01-01 PROCEDURE — 6370000000 HC RX 637 (ALT 250 FOR IP): Performed by: PHYSICIAN ASSISTANT

## 2022-01-01 PROCEDURE — 94660 CPAP INITIATION&MGMT: CPT

## 2022-01-01 PROCEDURE — 2580000003 HC RX 258: Performed by: INTERNAL MEDICINE

## 2022-01-01 PROCEDURE — 99233 SBSQ HOSP IP/OBS HIGH 50: CPT | Performed by: FAMILY MEDICINE

## 2022-01-01 PROCEDURE — 6370000000 HC RX 637 (ALT 250 FOR IP): Performed by: INTERNAL MEDICINE

## 2022-01-01 PROCEDURE — 6370000000 HC RX 637 (ALT 250 FOR IP): Performed by: STUDENT IN AN ORGANIZED HEALTH CARE EDUCATION/TRAINING PROGRAM

## 2022-01-01 PROCEDURE — 86022 PLATELET ANTIBODIES: CPT

## 2022-01-01 PROCEDURE — 94640 AIRWAY INHALATION TREATMENT: CPT

## 2022-01-01 PROCEDURE — 93005 ELECTROCARDIOGRAM TRACING: CPT | Performed by: INTERNAL MEDICINE

## 2022-01-01 PROCEDURE — 99232 SBSQ HOSP IP/OBS MODERATE 35: CPT | Performed by: INTERNAL MEDICINE

## 2022-01-01 PROCEDURE — 94669 MECHANICAL CHEST WALL OSCILL: CPT

## 2022-01-01 PROCEDURE — 70450 CT HEAD/BRAIN W/O DYE: CPT

## 2022-01-01 PROCEDURE — 51798 US URINE CAPACITY MEASURE: CPT

## 2022-01-01 PROCEDURE — 85730 THROMBOPLASTIN TIME PARTIAL: CPT

## 2022-01-01 PROCEDURE — 0202U NFCT DS 22 TRGT SARS-COV-2: CPT

## 2022-01-01 PROCEDURE — 2500000003 HC RX 250 WO HCPCS: Performed by: INTERNAL MEDICINE

## 2022-01-01 PROCEDURE — 6360000002 HC RX W HCPCS: Performed by: FAMILY MEDICINE

## 2022-01-01 PROCEDURE — 84145 PROCALCITONIN (PCT): CPT

## 2022-01-01 PROCEDURE — 51702 INSERT TEMP BLADDER CATH: CPT

## 2022-01-01 PROCEDURE — 94760 N-INVAS EAR/PLS OXIMETRY 1: CPT

## 2022-01-01 RX ORDER — PROMETHAZINE HYDROCHLORIDE 25 MG/ML
6.25 INJECTION, SOLUTION INTRAMUSCULAR; INTRAVENOUS EVERY 6 HOURS PRN
Status: DISCONTINUED | OUTPATIENT
Start: 2022-01-01 | End: 2022-01-01

## 2022-01-01 RX ORDER — CODEINE SULFATE 30 MG/1
30 TABLET ORAL EVERY 6 HOURS PRN
Status: DISCONTINUED | OUTPATIENT
Start: 2022-01-01 | End: 2022-01-01

## 2022-01-01 RX ORDER — HYDROXYZINE HYDROCHLORIDE 50 MG/ML
50 INJECTION, SOLUTION INTRAMUSCULAR ONCE
Status: COMPLETED | OUTPATIENT
Start: 2022-01-01 | End: 2022-01-01

## 2022-01-01 RX ORDER — TRAZODONE HYDROCHLORIDE 50 MG/1
50 TABLET ORAL NIGHTLY PRN
Status: DISCONTINUED | OUTPATIENT
Start: 2022-01-01 | End: 2022-01-01

## 2022-01-01 RX ORDER — LORAZEPAM 2 MG/ML
1 INJECTION INTRAMUSCULAR ONCE
Status: COMPLETED | OUTPATIENT
Start: 2022-01-01 | End: 2022-01-01

## 2022-01-01 RX ORDER — ALBUTEROL SULFATE 90 UG/1
2 AEROSOL, METERED RESPIRATORY (INHALATION) 2 TIMES DAILY
Status: DISCONTINUED | OUTPATIENT
Start: 2022-01-01 | End: 2022-01-01

## 2022-01-01 RX ORDER — MORPHINE SULFATE 2 MG/ML
2 INJECTION, SOLUTION INTRAMUSCULAR; INTRAVENOUS EVERY 4 HOURS PRN
Status: DISCONTINUED | OUTPATIENT
Start: 2022-01-01 | End: 2022-01-01

## 2022-01-01 RX ORDER — LORAZEPAM 2 MG/ML
2 INJECTION INTRAMUSCULAR ONCE
Status: COMPLETED | OUTPATIENT
Start: 2022-01-01 | End: 2022-01-01

## 2022-01-01 RX ORDER — ACETAMINOPHEN 500 MG
1000 TABLET ORAL 4 TIMES DAILY
Status: DISCONTINUED | OUTPATIENT
Start: 2022-01-01 | End: 2022-01-01

## 2022-01-01 RX ORDER — ALBUTEROL SULFATE 90 UG/1
2 AEROSOL, METERED RESPIRATORY (INHALATION) 3 TIMES DAILY
Status: DISCONTINUED | OUTPATIENT
Start: 2022-01-01 | End: 2022-01-01

## 2022-01-01 RX ORDER — TRAMADOL HYDROCHLORIDE 50 MG/1
50 TABLET ORAL EVERY 6 HOURS PRN
Status: DISCONTINUED | OUTPATIENT
Start: 2022-01-01 | End: 2022-01-01

## 2022-01-01 RX ORDER — HYDROCODONE BITARTRATE AND ACETAMINOPHEN 5; 325 MG/1; MG/1
1 TABLET ORAL EVERY 4 HOURS PRN
Status: DISCONTINUED | OUTPATIENT
Start: 2022-01-01 | End: 2022-01-01

## 2022-01-01 RX ORDER — BENZONATATE 100 MG/1
100 CAPSULE ORAL EVERY 8 HOURS SCHEDULED
Status: DISCONTINUED | OUTPATIENT
Start: 2022-01-01 | End: 2022-01-01

## 2022-01-01 RX ORDER — CALCIUM CARBONATE 200(500)MG
500 TABLET,CHEWABLE ORAL 3 TIMES DAILY PRN
Status: DISCONTINUED | OUTPATIENT
Start: 2022-01-01 | End: 2022-01-01

## 2022-01-01 RX ORDER — ALBUTEROL SULFATE 90 UG/1
2 AEROSOL, METERED RESPIRATORY (INHALATION) EVERY 4 HOURS PRN
Status: DISCONTINUED | OUTPATIENT
Start: 2022-01-01 | End: 2022-01-01

## 2022-01-01 RX ORDER — BENZONATATE 100 MG/1
100 CAPSULE ORAL 3 TIMES DAILY PRN
Status: DISCONTINUED | OUTPATIENT
Start: 2022-01-01 | End: 2022-01-01

## 2022-01-01 RX ORDER — ZIPRASIDONE HYDROCHLORIDE 20 MG/1
20 CAPSULE ORAL ONCE
Status: COMPLETED | OUTPATIENT
Start: 2022-01-01 | End: 2022-01-01

## 2022-01-01 RX ORDER — LIDOCAINE 4 G/G
2 PATCH TOPICAL DAILY
Status: DISCONTINUED | OUTPATIENT
Start: 2022-01-01 | End: 2022-01-01

## 2022-01-01 RX ORDER — ZIPRASIDONE MESYLATE 20 MG/ML
20 INJECTION, POWDER, LYOPHILIZED, FOR SOLUTION INTRAMUSCULAR ONCE
Status: DISCONTINUED | OUTPATIENT
Start: 2022-01-01 | End: 2022-01-01

## 2022-01-01 RX ORDER — LORAZEPAM 2 MG/ML
1 INJECTION INTRAMUSCULAR EVERY 6 HOURS PRN
Status: DISCONTINUED | OUTPATIENT
Start: 2022-01-01 | End: 2022-01-01

## 2022-01-01 RX ORDER — TRAMADOL HYDROCHLORIDE 50 MG/1
50 TABLET ORAL EVERY 4 HOURS PRN
Status: DISCONTINUED | OUTPATIENT
Start: 2022-01-01 | End: 2022-01-01

## 2022-01-01 RX ORDER — GLYCOPYRROLATE 0.2 MG/ML
0.2 INJECTION INTRAMUSCULAR; INTRAVENOUS
Status: DISCONTINUED | OUTPATIENT
Start: 2022-01-01 | End: 2022-01-01 | Stop reason: HOSPADM

## 2022-01-01 RX ORDER — ACETAMINOPHEN AND CODEINE PHOSPHATE 120; 12 MG/5ML; MG/5ML
15 SOLUTION ORAL EVERY 6 HOURS PRN
Status: DISCONTINUED | OUTPATIENT
Start: 2022-01-01 | End: 2022-01-01

## 2022-01-01 RX ORDER — MORPHINE SULFATE 4 MG/ML
4 INJECTION, SOLUTION INTRAMUSCULAR; INTRAVENOUS
Status: DISCONTINUED | OUTPATIENT
Start: 2022-01-01 | End: 2022-01-01 | Stop reason: HOSPADM

## 2022-01-01 RX ORDER — SERTRALINE HYDROCHLORIDE 25 MG/1
25 TABLET, FILM COATED ORAL DAILY
Status: DISCONTINUED | OUTPATIENT
Start: 2022-01-01 | End: 2022-01-01

## 2022-01-01 RX ORDER — GUAIFENESIN/DEXTROMETHORPHAN 100-10MG/5
10 SYRUP ORAL EVERY 4 HOURS PRN
Status: DISCONTINUED | OUTPATIENT
Start: 2022-01-01 | End: 2022-01-01

## 2022-01-01 RX ORDER — ALBUTEROL SULFATE 90 UG/1
2 AEROSOL, METERED RESPIRATORY (INHALATION) 4 TIMES DAILY
Status: DISCONTINUED | OUTPATIENT
Start: 2022-01-01 | End: 2022-01-01

## 2022-01-01 RX ORDER — MORPHINE SULFATE 4 MG/ML
4 INJECTION, SOLUTION INTRAMUSCULAR; INTRAVENOUS EVERY 4 HOURS PRN
Status: DISCONTINUED | OUTPATIENT
Start: 2022-01-01 | End: 2022-01-01

## 2022-01-01 RX ORDER — DEXAMETHASONE SODIUM PHOSPHATE 4 MG/ML
4 INJECTION, SOLUTION INTRA-ARTICULAR; INTRALESIONAL; INTRAMUSCULAR; INTRAVENOUS; SOFT TISSUE EVERY 6 HOURS
Status: DISCONTINUED | OUTPATIENT
Start: 2022-01-01 | End: 2022-01-01

## 2022-01-01 RX ORDER — DEXMEDETOMIDINE HYDROCHLORIDE 4 UG/ML
0.3 INJECTION, SOLUTION INTRAVENOUS CONTINUOUS
Status: DISCONTINUED | OUTPATIENT
Start: 2022-01-01 | End: 2022-01-01

## 2022-01-01 RX ORDER — PROMETHAZINE HYDROCHLORIDE 6.25 MG/5ML
6.25 SYRUP ORAL EVERY 4 HOURS PRN
Status: DISCONTINUED | OUTPATIENT
Start: 2022-01-01 | End: 2022-01-01

## 2022-01-01 RX ORDER — CODEINE SULFATE 30 MG/1
30 TABLET ORAL EVERY 4 HOURS PRN
Status: DISCONTINUED | OUTPATIENT
Start: 2022-01-01 | End: 2022-01-01

## 2022-01-01 RX ORDER — DEXAMETHASONE SODIUM PHOSPHATE 4 MG/ML
10 INJECTION, SOLUTION INTRA-ARTICULAR; INTRALESIONAL; INTRAMUSCULAR; INTRAVENOUS; SOFT TISSUE DAILY
Status: COMPLETED | OUTPATIENT
Start: 2022-01-01 | End: 2022-01-01

## 2022-01-01 RX ORDER — LORAZEPAM 2 MG/ML
1 INJECTION INTRAMUSCULAR EVERY 30 MIN PRN
Status: DISCONTINUED | OUTPATIENT
Start: 2022-01-01 | End: 2022-01-01 | Stop reason: HOSPADM

## 2022-01-01 RX ORDER — ALPRAZOLAM 0.25 MG/1
0.25 TABLET ORAL 2 TIMES DAILY PRN
Status: DISCONTINUED | OUTPATIENT
Start: 2022-01-01 | End: 2022-01-01

## 2022-01-01 RX ADMIN — Medication 2000 UNITS: at 08:53

## 2022-01-01 RX ADMIN — BENZONATATE 100 MG: 100 CAPSULE ORAL at 06:09

## 2022-01-01 RX ADMIN — ACETAMINOPHEN 650 MG: 325 TABLET ORAL at 20:48

## 2022-01-01 RX ADMIN — Medication 2000 UNITS: at 08:22

## 2022-01-01 RX ADMIN — FAMOTIDINE 20 MG: 20 TABLET ORAL at 08:19

## 2022-01-01 RX ADMIN — MORPHINE SULFATE 4 MG: 4 INJECTION, SOLUTION INTRAMUSCULAR; INTRAVENOUS at 22:10

## 2022-01-01 RX ADMIN — BENZONATATE 100 MG: 100 CAPSULE ORAL at 13:18

## 2022-01-01 RX ADMIN — ENOXAPARIN SODIUM 30 MG: 100 INJECTION SUBCUTANEOUS at 22:07

## 2022-01-01 RX ADMIN — Medication 50 MG: at 09:22

## 2022-01-01 RX ADMIN — ENOXAPARIN SODIUM 30 MG: 100 INJECTION SUBCUTANEOUS at 20:50

## 2022-01-01 RX ADMIN — LORAZEPAM 1 MG: 2 INJECTION INTRAMUSCULAR; INTRAVENOUS at 21:42

## 2022-01-01 RX ADMIN — BENZONATATE 100 MG: 100 CAPSULE ORAL at 15:40

## 2022-01-01 RX ADMIN — ENOXAPARIN SODIUM 30 MG: 100 INJECTION SUBCUTANEOUS at 07:47

## 2022-01-01 RX ADMIN — Medication 2 PUFF: at 18:18

## 2022-01-01 RX ADMIN — OXYCODONE HYDROCHLORIDE AND ACETAMINOPHEN 1000 MG: 500 TABLET ORAL at 08:23

## 2022-01-01 RX ADMIN — MORPHINE SULFATE 4 MG: 4 INJECTION, SOLUTION INTRAMUSCULAR; INTRAVENOUS at 16:25

## 2022-01-01 RX ADMIN — TRAZODONE HYDROCHLORIDE 50 MG: 50 TABLET ORAL at 20:48

## 2022-01-01 RX ADMIN — PROMETHAZINE HYDROCHLORIDE 6.25 MG: 25 INJECTION INTRAMUSCULAR; INTRAVENOUS at 16:50

## 2022-01-01 RX ADMIN — ACETAMINOPHEN 1000 MG: 500 TABLET ORAL at 13:04

## 2022-01-01 RX ADMIN — ALBUTEROL SULFATE 2 PUFF: 90 AEROSOL, METERED RESPIRATORY (INHALATION) at 17:13

## 2022-01-01 RX ADMIN — MORPHINE SULFATE 4 MG: 4 INJECTION, SOLUTION INTRAMUSCULAR; INTRAVENOUS at 10:54

## 2022-01-01 RX ADMIN — Medication 2 PUFF: at 09:25

## 2022-01-01 RX ADMIN — Medication 50 MG: at 08:19

## 2022-01-01 RX ADMIN — DEXAMETHASONE SODIUM PHOSPHATE 10 MG: 4 INJECTION, SOLUTION INTRAMUSCULAR; INTRAVENOUS at 11:39

## 2022-01-01 RX ADMIN — BENZONATATE 100 MG: 100 CAPSULE ORAL at 20:48

## 2022-01-01 RX ADMIN — OXYCODONE HYDROCHLORIDE AND ACETAMINOPHEN 1000 MG: 500 TABLET ORAL at 09:07

## 2022-01-01 RX ADMIN — ENOXAPARIN SODIUM 30 MG: 100 INJECTION SUBCUTANEOUS at 09:22

## 2022-01-01 RX ADMIN — FAMOTIDINE 20 MG: 20 TABLET ORAL at 22:07

## 2022-01-01 RX ADMIN — SODIUM CHLORIDE 25 ML: 9 INJECTION, SOLUTION INTRAVENOUS at 10:48

## 2022-01-01 RX ADMIN — ENOXAPARIN SODIUM 30 MG: 100 INJECTION SUBCUTANEOUS at 08:42

## 2022-01-01 RX ADMIN — ENOXAPARIN SODIUM 30 MG: 100 INJECTION SUBCUTANEOUS at 08:33

## 2022-01-01 RX ADMIN — Medication 6.25 MG: at 11:43

## 2022-01-01 RX ADMIN — SERTRALINE 25 MG: 25 TABLET, FILM COATED ORAL at 09:11

## 2022-01-01 RX ADMIN — ACETAMINOPHEN 1000 MG: 500 TABLET ORAL at 08:32

## 2022-01-01 RX ADMIN — BENZONATATE 100 MG: 100 CAPSULE ORAL at 22:07

## 2022-01-01 RX ADMIN — Medication 2 PUFF: at 20:20

## 2022-01-01 RX ADMIN — OXYCODONE HYDROCHLORIDE AND ACETAMINOPHEN 1000 MG: 500 TABLET ORAL at 08:19

## 2022-01-01 RX ADMIN — LORAZEPAM 1 MG: 2 INJECTION INTRAMUSCULAR; INTRAVENOUS at 12:49

## 2022-01-01 RX ADMIN — TRAZODONE HYDROCHLORIDE 50 MG: 50 TABLET ORAL at 21:28

## 2022-01-01 RX ADMIN — Medication 2000 UNITS: at 07:58

## 2022-01-01 RX ADMIN — SODIUM CHLORIDE: 9 INJECTION, SOLUTION INTRAVENOUS at 03:44

## 2022-01-01 RX ADMIN — TRAMADOL HYDROCHLORIDE 50 MG: 50 TABLET, COATED ORAL at 08:40

## 2022-01-01 RX ADMIN — TRAMADOL HYDROCHLORIDE 50 MG: 50 TABLET, COATED ORAL at 21:25

## 2022-01-01 RX ADMIN — BENZONATATE 100 MG: 100 CAPSULE ORAL at 15:10

## 2022-01-01 RX ADMIN — ACETAMINOPHEN 650 MG: 325 TABLET ORAL at 09:25

## 2022-01-01 RX ADMIN — Medication 2 PUFF: at 09:44

## 2022-01-01 RX ADMIN — Medication 50 MG: at 08:32

## 2022-01-01 RX ADMIN — FAMOTIDINE 20 MG: 20 TABLET ORAL at 21:25

## 2022-01-01 RX ADMIN — FAMOTIDINE 20 MG: 20 TABLET ORAL at 20:01

## 2022-01-01 RX ADMIN — MORPHINE SULFATE 4 MG: 4 INJECTION, SOLUTION INTRAMUSCULAR; INTRAVENOUS at 15:57

## 2022-01-01 RX ADMIN — BENZONATATE 100 MG: 100 CAPSULE ORAL at 21:34

## 2022-01-01 RX ADMIN — SODIUM CHLORIDE, PRESERVATIVE FREE 10 ML: 5 INJECTION INTRAVENOUS at 08:18

## 2022-01-01 RX ADMIN — BENZONATATE 100 MG: 100 CAPSULE ORAL at 05:30

## 2022-01-01 RX ADMIN — MORPHINE SULFATE 4 MG: 4 INJECTION, SOLUTION INTRAMUSCULAR; INTRAVENOUS at 09:26

## 2022-01-01 RX ADMIN — ENOXAPARIN SODIUM 30 MG: 100 INJECTION SUBCUTANEOUS at 21:25

## 2022-01-01 RX ADMIN — MORPHINE SULFATE 4 MG: 4 INJECTION, SOLUTION INTRAMUSCULAR; INTRAVENOUS at 18:09

## 2022-01-01 RX ADMIN — ONDANSETRON 4 MG: 2 INJECTION INTRAMUSCULAR; INTRAVENOUS at 21:07

## 2022-01-01 RX ADMIN — DESVENLAFAXINE 100 MG: 100 TABLET, EXTENDED RELEASE ORAL at 20:35

## 2022-01-01 RX ADMIN — ONDANSETRON 4 MG: 4 TABLET, ORALLY DISINTEGRATING ORAL at 21:34

## 2022-01-01 RX ADMIN — Medication 50 MG: at 08:22

## 2022-01-01 RX ADMIN — SERTRALINE 25 MG: 25 TABLET, FILM COATED ORAL at 08:19

## 2022-01-01 RX ADMIN — TRAMADOL HYDROCHLORIDE 50 MG: 50 TABLET, COATED ORAL at 16:13

## 2022-01-01 RX ADMIN — TRAMADOL HYDROCHLORIDE 50 MG: 50 TABLET, COATED ORAL at 02:13

## 2022-01-01 RX ADMIN — Medication 50 MG: at 08:51

## 2022-01-01 RX ADMIN — TRAZODONE HYDROCHLORIDE 50 MG: 50 TABLET ORAL at 20:56

## 2022-01-01 RX ADMIN — SODIUM CHLORIDE: 9 INJECTION, SOLUTION INTRAVENOUS at 15:32

## 2022-01-01 RX ADMIN — OXYCODONE HYDROCHLORIDE AND ACETAMINOPHEN 1000 MG: 500 TABLET ORAL at 08:51

## 2022-01-01 RX ADMIN — ONDANSETRON 4 MG: 2 INJECTION INTRAMUSCULAR; INTRAVENOUS at 10:21

## 2022-01-01 RX ADMIN — DEXAMETHASONE SODIUM PHOSPHATE 10 MG: 4 INJECTION, SOLUTION INTRAMUSCULAR; INTRAVENOUS at 08:22

## 2022-01-01 RX ADMIN — MORPHINE SULFATE 4 MG: 4 INJECTION, SOLUTION INTRAMUSCULAR; INTRAVENOUS at 22:58

## 2022-01-01 RX ADMIN — FAMOTIDINE 20 MG: 20 TABLET ORAL at 09:07

## 2022-01-01 RX ADMIN — MORPHINE SULFATE 4 MG: 4 INJECTION, SOLUTION INTRAMUSCULAR; INTRAVENOUS at 14:08

## 2022-01-01 RX ADMIN — Medication 2000 UNITS: at 08:51

## 2022-01-01 RX ADMIN — LORAZEPAM 1 MG: 2 INJECTION INTRAMUSCULAR; INTRAVENOUS at 09:29

## 2022-01-01 RX ADMIN — MORPHINE SULFATE 4 MG: 4 INJECTION, SOLUTION INTRAMUSCULAR; INTRAVENOUS at 08:29

## 2022-01-01 RX ADMIN — BENZONATATE 100 MG: 100 CAPSULE ORAL at 22:16

## 2022-01-01 RX ADMIN — ALBUTEROL SULFATE 2 PUFF: 90 AEROSOL, METERED RESPIRATORY (INHALATION) at 10:33

## 2022-01-01 RX ADMIN — DEXAMETHASONE SODIUM PHOSPHATE 6 MG: 4 INJECTION, SOLUTION INTRA-ARTICULAR; INTRALESIONAL; INTRAMUSCULAR; INTRAVENOUS; SOFT TISSUE at 20:55

## 2022-01-01 RX ADMIN — Medication 2 PUFF: at 12:34

## 2022-01-01 RX ADMIN — GUAIFENESIN 600 MG: 600 TABLET, EXTENDED RELEASE ORAL at 08:40

## 2022-01-01 RX ADMIN — ALBUTEROL SULFATE 2 PUFF: 90 AEROSOL, METERED RESPIRATORY (INHALATION) at 22:36

## 2022-01-01 RX ADMIN — ENOXAPARIN SODIUM 30 MG: 100 INJECTION SUBCUTANEOUS at 08:19

## 2022-01-01 RX ADMIN — ENOXAPARIN SODIUM 30 MG: 100 INJECTION SUBCUTANEOUS at 09:13

## 2022-01-01 RX ADMIN — SODIUM CHLORIDE: 9 INJECTION, SOLUTION INTRAVENOUS at 17:23

## 2022-01-01 RX ADMIN — BENZONATATE 100 MG: 100 CAPSULE ORAL at 05:22

## 2022-01-01 RX ADMIN — SODIUM CHLORIDE, PRESERVATIVE FREE 10 ML: 5 INJECTION INTRAVENOUS at 21:25

## 2022-01-01 RX ADMIN — ONDANSETRON 4 MG: 2 INJECTION INTRAMUSCULAR; INTRAVENOUS at 08:53

## 2022-01-01 RX ADMIN — MORPHINE SULFATE 4 MG: 4 INJECTION, SOLUTION INTRAMUSCULAR; INTRAVENOUS at 04:12

## 2022-01-01 RX ADMIN — ACETAMINOPHEN 1000 MG: 500 TABLET ORAL at 21:28

## 2022-01-01 RX ADMIN — ACETAMINOPHEN 1000 MG: 500 TABLET ORAL at 08:20

## 2022-01-01 RX ADMIN — Medication 2000 UNITS: at 08:18

## 2022-01-01 RX ADMIN — SERTRALINE 25 MG: 25 TABLET, FILM COATED ORAL at 08:22

## 2022-01-01 RX ADMIN — DEXAMETHASONE SODIUM PHOSPHATE 10 MG: 4 INJECTION, SOLUTION INTRAMUSCULAR; INTRAVENOUS at 09:11

## 2022-01-01 RX ADMIN — FAMOTIDINE 20 MG: 20 TABLET ORAL at 20:56

## 2022-01-01 RX ADMIN — FAMOTIDINE 20 MG: 20 TABLET ORAL at 08:22

## 2022-01-01 RX ADMIN — ENOXAPARIN SODIUM 30 MG: 100 INJECTION SUBCUTANEOUS at 20:55

## 2022-01-01 RX ADMIN — SODIUM CHLORIDE: 9 INJECTION, SOLUTION INTRAVENOUS at 12:09

## 2022-01-01 RX ADMIN — DESVENLAFAXINE 100 MG: 100 TABLET, EXTENDED RELEASE ORAL at 21:27

## 2022-01-01 RX ADMIN — LORAZEPAM 2 MG: 2 INJECTION INTRAMUSCULAR; INTRAVENOUS at 01:50

## 2022-01-01 RX ADMIN — MORPHINE SULFATE 4 MG: 4 INJECTION, SOLUTION INTRAMUSCULAR; INTRAVENOUS at 15:48

## 2022-01-01 RX ADMIN — SODIUM CHLORIDE: 9 INJECTION, SOLUTION INTRAVENOUS at 07:47

## 2022-01-01 RX ADMIN — OXYCODONE HYDROCHLORIDE AND ACETAMINOPHEN 1000 MG: 500 TABLET ORAL at 08:40

## 2022-01-01 RX ADMIN — SODIUM CHLORIDE, PRESERVATIVE FREE 10 ML: 5 INJECTION INTRAVENOUS at 21:29

## 2022-01-01 RX ADMIN — DESVENLAFAXINE 100 MG: 100 TABLET, EXTENDED RELEASE ORAL at 22:07

## 2022-01-01 RX ADMIN — BENZONATATE 100 MG: 100 CAPSULE ORAL at 14:23

## 2022-01-01 RX ADMIN — ACETAMINOPHEN 650 MG: 325 TABLET ORAL at 02:51

## 2022-01-01 RX ADMIN — MORPHINE SULFATE 4 MG: 4 INJECTION, SOLUTION INTRAMUSCULAR; INTRAVENOUS at 13:38

## 2022-01-01 RX ADMIN — MORPHINE SULFATE 4 MG: 4 INJECTION, SOLUTION INTRAMUSCULAR; INTRAVENOUS at 20:35

## 2022-01-01 RX ADMIN — FAMOTIDINE 20 MG: 20 TABLET ORAL at 09:22

## 2022-01-01 RX ADMIN — ALBUTEROL SULFATE 2 PUFF: 90 AEROSOL, METERED RESPIRATORY (INHALATION) at 14:23

## 2022-01-01 RX ADMIN — LORAZEPAM 1 MG: 2 INJECTION INTRAMUSCULAR; INTRAVENOUS at 05:27

## 2022-01-01 RX ADMIN — MORPHINE SULFATE 4 MG: 4 INJECTION, SOLUTION INTRAMUSCULAR; INTRAVENOUS at 07:47

## 2022-01-01 RX ADMIN — ACETAMINOPHEN 1000 MG: 500 TABLET ORAL at 20:56

## 2022-01-01 RX ADMIN — BENZONATATE 100 MG: 100 CAPSULE ORAL at 05:58

## 2022-01-01 RX ADMIN — LORAZEPAM 1 MG: 2 INJECTION INTRAMUSCULAR; INTRAVENOUS at 07:00

## 2022-01-01 RX ADMIN — TRAMADOL HYDROCHLORIDE 50 MG: 50 TABLET, COATED ORAL at 17:26

## 2022-01-01 RX ADMIN — LORAZEPAM 1 MG: 2 INJECTION INTRAMUSCULAR; INTRAVENOUS at 13:59

## 2022-01-01 RX ADMIN — GUAIFENESIN 600 MG: 600 TABLET, EXTENDED RELEASE ORAL at 09:00

## 2022-01-01 RX ADMIN — FAMOTIDINE 20 MG: 20 TABLET ORAL at 08:20

## 2022-01-01 RX ADMIN — ENOXAPARIN SODIUM 30 MG: 100 INJECTION SUBCUTANEOUS at 21:07

## 2022-01-01 RX ADMIN — ALBUTEROL SULFATE 2 PUFF: 90 AEROSOL, METERED RESPIRATORY (INHALATION) at 14:43

## 2022-01-01 RX ADMIN — BARICITINIB 4 MG: 2 TABLET, FILM COATED ORAL at 20:55

## 2022-01-01 RX ADMIN — LORAZEPAM 1 MG: 2 INJECTION INTRAMUSCULAR; INTRAVENOUS at 15:42

## 2022-01-01 RX ADMIN — SODIUM CHLORIDE: 9 INJECTION, SOLUTION INTRAVENOUS at 05:21

## 2022-01-01 RX ADMIN — ACETAMINOPHEN 650 MG: 325 TABLET ORAL at 16:37

## 2022-01-01 RX ADMIN — DEXAMETHASONE SODIUM PHOSPHATE 20 MG: 4 INJECTION, SOLUTION INTRAMUSCULAR; INTRAVENOUS at 08:47

## 2022-01-01 RX ADMIN — ANTACID TABLETS 500 MG: 500 TABLET, CHEWABLE ORAL at 17:30

## 2022-01-01 RX ADMIN — TRAZODONE HYDROCHLORIDE 50 MG: 50 TABLET ORAL at 22:07

## 2022-01-01 RX ADMIN — FAMOTIDINE 20 MG: 20 TABLET ORAL at 20:48

## 2022-01-01 RX ADMIN — TRAMADOL HYDROCHLORIDE 50 MG: 50 TABLET, COATED ORAL at 06:40

## 2022-01-01 RX ADMIN — DEXMEDETOMIDINE HYDROCHLORIDE 0.1 MCG/KG/HR: 4 INJECTION, SOLUTION INTRAVENOUS at 09:26

## 2022-01-01 RX ADMIN — MORPHINE SULFATE 4 MG: 4 INJECTION, SOLUTION INTRAMUSCULAR; INTRAVENOUS at 19:51

## 2022-01-01 RX ADMIN — MORPHINE SULFATE 4 MG: 4 INJECTION, SOLUTION INTRAMUSCULAR; INTRAVENOUS at 06:08

## 2022-01-01 RX ADMIN — Medication 2 PUFF: at 09:06

## 2022-01-01 RX ADMIN — GUAIFENESIN 600 MG: 600 TABLET, EXTENDED RELEASE ORAL at 21:27

## 2022-01-01 RX ADMIN — ZIPRASIDONE HYDROCHLORIDE 20 MG: 20 CAPSULE ORAL at 17:10

## 2022-01-01 RX ADMIN — Medication 2 PUFF: at 21:29

## 2022-01-01 RX ADMIN — DEXAMETHASONE SODIUM PHOSPHATE 10 MG: 4 INJECTION, SOLUTION INTRAMUSCULAR; INTRAVENOUS at 08:20

## 2022-01-01 RX ADMIN — ENOXAPARIN SODIUM 30 MG: 100 INJECTION SUBCUTANEOUS at 20:36

## 2022-01-01 RX ADMIN — FAMOTIDINE 20 MG: 20 TABLET ORAL at 08:40

## 2022-01-01 RX ADMIN — SODIUM CHLORIDE: 9 INJECTION, SOLUTION INTRAVENOUS at 03:16

## 2022-01-01 RX ADMIN — ZIPRASIDONE HYDROCHLORIDE 20 MG: 20 CAPSULE ORAL at 01:33

## 2022-01-01 RX ADMIN — GUAIFENESIN AND DEXTROMETHORPHAN 10 ML: 100; 10 SYRUP ORAL at 03:16

## 2022-01-01 RX ADMIN — GUAIFENESIN AND DEXTROMETHORPHAN 10 ML: 100; 10 SYRUP ORAL at 12:07

## 2022-01-01 RX ADMIN — ENOXAPARIN SODIUM 30 MG: 100 INJECTION SUBCUTANEOUS at 08:23

## 2022-01-01 RX ADMIN — CODEINE SULFATE 30 MG: 30 TABLET ORAL at 18:10

## 2022-01-01 RX ADMIN — SODIUM CHLORIDE, PRESERVATIVE FREE 10 ML: 5 INJECTION INTRAVENOUS at 08:20

## 2022-01-01 RX ADMIN — TRAZODONE HYDROCHLORIDE 50 MG: 50 TABLET ORAL at 21:25

## 2022-01-01 RX ADMIN — Medication 2 PUFF: at 10:24

## 2022-01-01 RX ADMIN — LORAZEPAM 1 MG: 2 INJECTION INTRAMUSCULAR; INTRAVENOUS at 03:14

## 2022-01-01 RX ADMIN — ENOXAPARIN SODIUM 30 MG: 100 INJECTION SUBCUTANEOUS at 20:39

## 2022-01-01 RX ADMIN — GUAIFENESIN 600 MG: 600 TABLET, EXTENDED RELEASE ORAL at 14:31

## 2022-01-01 RX ADMIN — ENOXAPARIN SODIUM 30 MG: 100 INJECTION SUBCUTANEOUS at 20:31

## 2022-01-01 RX ADMIN — DEXAMETHASONE SODIUM PHOSPHATE 20 MG: 4 INJECTION, SOLUTION INTRAMUSCULAR; INTRAVENOUS at 09:45

## 2022-01-01 RX ADMIN — Medication 2 PUFF: at 20:40

## 2022-01-01 RX ADMIN — BENZONATATE 100 MG: 100 CAPSULE ORAL at 06:40

## 2022-01-01 RX ADMIN — ONDANSETRON 4 MG: 2 INJECTION INTRAMUSCULAR; INTRAVENOUS at 00:49

## 2022-01-01 RX ADMIN — ONDANSETRON 4 MG: 2 INJECTION INTRAMUSCULAR; INTRAVENOUS at 07:58

## 2022-01-01 RX ADMIN — ENOXAPARIN SODIUM 30 MG: 100 INJECTION SUBCUTANEOUS at 08:02

## 2022-01-01 RX ADMIN — CODEINE SULFATE 30 MG: 30 TABLET ORAL at 08:40

## 2022-01-01 RX ADMIN — ENOXAPARIN SODIUM 30 MG: 100 INJECTION SUBCUTANEOUS at 20:30

## 2022-01-01 RX ADMIN — DEXAMETHASONE SODIUM PHOSPHATE 20 MG: 4 INJECTION, SOLUTION INTRAMUSCULAR; INTRAVENOUS at 10:49

## 2022-01-01 RX ADMIN — TRAMADOL HYDROCHLORIDE 50 MG: 50 TABLET, COATED ORAL at 22:07

## 2022-01-01 RX ADMIN — Medication 50 MG: at 07:58

## 2022-01-01 RX ADMIN — DESVENLAFAXINE 100 MG: 100 TABLET, EXTENDED RELEASE ORAL at 20:01

## 2022-01-01 RX ADMIN — MORPHINE SULFATE 4 MG: 4 INJECTION, SOLUTION INTRAMUSCULAR; INTRAVENOUS at 11:59

## 2022-01-01 RX ADMIN — BENZONATATE 100 MG: 100 CAPSULE ORAL at 06:07

## 2022-01-01 RX ADMIN — FAMOTIDINE 20 MG: 20 TABLET ORAL at 08:15

## 2022-01-01 RX ADMIN — MORPHINE SULFATE 4 MG: 4 INJECTION, SOLUTION INTRAMUSCULAR; INTRAVENOUS at 07:31

## 2022-01-01 RX ADMIN — Medication 2 PUFF: at 17:15

## 2022-01-01 RX ADMIN — Medication 2000 UNITS: at 09:22

## 2022-01-01 RX ADMIN — TRAMADOL HYDROCHLORIDE 50 MG: 50 TABLET, COATED ORAL at 02:51

## 2022-01-01 RX ADMIN — LORAZEPAM 1 MG: 2 INJECTION INTRAMUSCULAR; INTRAVENOUS at 19:29

## 2022-01-01 RX ADMIN — TRAMADOL HYDROCHLORIDE 50 MG: 50 TABLET ORAL at 17:41

## 2022-01-01 RX ADMIN — SERTRALINE 25 MG: 25 TABLET, FILM COATED ORAL at 14:12

## 2022-01-01 RX ADMIN — CODEINE SULFATE 30 MG: 30 TABLET ORAL at 02:59

## 2022-01-01 RX ADMIN — GUAIFENESIN 600 MG: 600 TABLET, EXTENDED RELEASE ORAL at 21:34

## 2022-01-01 RX ADMIN — TRAMADOL HYDROCHLORIDE 50 MG: 50 TABLET, COATED ORAL at 09:22

## 2022-01-01 RX ADMIN — MORPHINE SULFATE 4 MG: 4 INJECTION, SOLUTION INTRAMUSCULAR; INTRAVENOUS at 02:03

## 2022-01-01 RX ADMIN — FAMOTIDINE 20 MG: 20 TABLET ORAL at 21:07

## 2022-01-01 RX ADMIN — Medication 2000 UNITS: at 08:32

## 2022-01-01 RX ADMIN — SODIUM CHLORIDE: 9 INJECTION, SOLUTION INTRAVENOUS at 18:17

## 2022-01-01 RX ADMIN — Medication 2000 UNITS: at 09:07

## 2022-01-01 RX ADMIN — BENZONATATE 100 MG: 100 CAPSULE ORAL at 21:37

## 2022-01-01 RX ADMIN — TRAMADOL HYDROCHLORIDE 50 MG: 50 TABLET, COATED ORAL at 00:40

## 2022-01-01 RX ADMIN — ENOXAPARIN SODIUM 30 MG: 100 INJECTION SUBCUTANEOUS at 19:25

## 2022-01-01 RX ADMIN — BENZONATATE 100 MG: 100 CAPSULE ORAL at 05:16

## 2022-01-01 RX ADMIN — DESVENLAFAXINE 100 MG: 100 TABLET, EXTENDED RELEASE ORAL at 21:34

## 2022-01-01 RX ADMIN — OXYCODONE HYDROCHLORIDE AND ACETAMINOPHEN 1000 MG: 500 TABLET ORAL at 09:22

## 2022-01-01 RX ADMIN — Medication 50 MG: at 08:39

## 2022-01-01 RX ADMIN — OXYCODONE HYDROCHLORIDE AND ACETAMINOPHEN 1000 MG: 500 TABLET ORAL at 09:11

## 2022-01-01 RX ADMIN — ENOXAPARIN SODIUM 30 MG: 100 INJECTION SUBCUTANEOUS at 09:09

## 2022-01-01 RX ADMIN — ANTACID TABLETS 500 MG: 500 TABLET, CHEWABLE ORAL at 11:05

## 2022-01-01 RX ADMIN — ACETAMINOPHEN 1000 MG: 500 TABLET ORAL at 17:26

## 2022-01-01 RX ADMIN — ENOXAPARIN SODIUM 30 MG: 100 INJECTION SUBCUTANEOUS at 08:52

## 2022-01-01 RX ADMIN — ACETAMINOPHEN 1000 MG: 500 TABLET ORAL at 13:17

## 2022-01-01 RX ADMIN — LORAZEPAM 1 MG: 2 INJECTION INTRAMUSCULAR; INTRAVENOUS at 00:27

## 2022-01-01 RX ADMIN — FAMOTIDINE 20 MG: 20 TABLET ORAL at 08:01

## 2022-01-01 RX ADMIN — TRAMADOL HYDROCHLORIDE 50 MG: 50 TABLET, COATED ORAL at 01:45

## 2022-01-01 RX ADMIN — LORAZEPAM 1 MG: 2 INJECTION INTRAMUSCULAR; INTRAVENOUS at 23:50

## 2022-01-01 RX ADMIN — LORAZEPAM 1 MG: 2 INJECTION INTRAMUSCULAR; INTRAVENOUS at 18:28

## 2022-01-01 RX ADMIN — LORAZEPAM 1 MG: 2 INJECTION INTRAMUSCULAR; INTRAVENOUS at 06:00

## 2022-01-01 RX ADMIN — DEXAMETHASONE SODIUM PHOSPHATE 20 MG: 4 INJECTION, SOLUTION INTRAMUSCULAR; INTRAVENOUS at 10:33

## 2022-01-01 RX ADMIN — GUAIFENESIN 600 MG: 600 TABLET, EXTENDED RELEASE ORAL at 08:19

## 2022-01-01 RX ADMIN — Medication 50 MG: at 09:12

## 2022-01-01 RX ADMIN — CODEINE SULFATE 30 MG: 30 TABLET ORAL at 09:22

## 2022-01-01 RX ADMIN — MORPHINE SULFATE 4 MG: 4 INJECTION, SOLUTION INTRAMUSCULAR; INTRAVENOUS at 03:25

## 2022-01-01 RX ADMIN — LORAZEPAM 1 MG: 2 INJECTION INTRAMUSCULAR; INTRAVENOUS at 01:18

## 2022-01-01 RX ADMIN — SERTRALINE 25 MG: 25 TABLET, FILM COATED ORAL at 08:40

## 2022-01-01 RX ADMIN — Medication 2000 UNITS: at 09:11

## 2022-01-01 RX ADMIN — DEXAMETHASONE SODIUM PHOSPHATE 10 MG: 4 INJECTION, SOLUTION INTRAMUSCULAR; INTRAVENOUS at 07:43

## 2022-01-01 RX ADMIN — MORPHINE SULFATE 4 MG: 4 INJECTION, SOLUTION INTRAMUSCULAR; INTRAVENOUS at 20:44

## 2022-01-01 RX ADMIN — DESVENLAFAXINE 100 MG: 100 TABLET, EXTENDED RELEASE ORAL at 20:48

## 2022-01-01 RX ADMIN — SODIUM CHLORIDE: 9 INJECTION, SOLUTION INTRAVENOUS at 19:28

## 2022-01-01 RX ADMIN — LORAZEPAM 1 MG: 2 INJECTION INTRAMUSCULAR; INTRAVENOUS at 04:10

## 2022-01-01 RX ADMIN — TRAZODONE HYDROCHLORIDE 50 MG: 50 TABLET ORAL at 00:40

## 2022-01-01 RX ADMIN — ENOXAPARIN SODIUM 30 MG: 100 INJECTION SUBCUTANEOUS at 21:38

## 2022-01-01 RX ADMIN — FAMOTIDINE 20 MG: 20 TABLET ORAL at 09:11

## 2022-01-01 RX ADMIN — TRAMADOL HYDROCHLORIDE 50 MG: 50 TABLET, COATED ORAL at 14:23

## 2022-01-01 RX ADMIN — DEXAMETHASONE SODIUM PHOSPHATE 20 MG: 4 INJECTION, SOLUTION INTRAMUSCULAR; INTRAVENOUS at 09:44

## 2022-01-01 RX ADMIN — FAMOTIDINE 20 MG: 20 TABLET ORAL at 20:25

## 2022-01-01 RX ADMIN — TRAMADOL HYDROCHLORIDE 50 MG: 50 TABLET, COATED ORAL at 10:49

## 2022-01-01 RX ADMIN — GUAIFENESIN 600 MG: 600 TABLET, EXTENDED RELEASE ORAL at 08:02

## 2022-01-01 RX ADMIN — TRAMADOL HYDROCHLORIDE 50 MG: 50 TABLET, COATED ORAL at 07:58

## 2022-01-01 RX ADMIN — Medication 2000 UNITS: at 08:19

## 2022-01-01 RX ADMIN — TRAMADOL HYDROCHLORIDE 50 MG: 50 TABLET, COATED ORAL at 21:45

## 2022-01-01 RX ADMIN — SODIUM CHLORIDE, PRESERVATIVE FREE 10 ML: 5 INJECTION INTRAVENOUS at 08:51

## 2022-01-01 RX ADMIN — HYDROXYZINE HYDROCHLORIDE 50 MG: 50 INJECTION, SOLUTION INTRAMUSCULAR at 00:43

## 2022-01-01 RX ADMIN — LORAZEPAM 1 MG: 2 INJECTION INTRAMUSCULAR; INTRAVENOUS at 19:30

## 2022-01-01 RX ADMIN — SERTRALINE 25 MG: 25 TABLET, FILM COATED ORAL at 09:22

## 2022-01-01 RX ADMIN — TRAMADOL HYDROCHLORIDE 50 MG: 50 TABLET, COATED ORAL at 10:38

## 2022-01-01 RX ADMIN — MORPHINE SULFATE 4 MG: 4 INJECTION, SOLUTION INTRAMUSCULAR; INTRAVENOUS at 01:17

## 2022-01-01 RX ADMIN — TRAMADOL HYDROCHLORIDE 50 MG: 50 TABLET, COATED ORAL at 18:10

## 2022-01-01 RX ADMIN — FAMOTIDINE 20 MG: 20 TABLET ORAL at 08:51

## 2022-01-01 RX ADMIN — ENOXAPARIN SODIUM 30 MG: 100 INJECTION SUBCUTANEOUS at 07:31

## 2022-01-01 RX ADMIN — Medication 2 PUFF: at 09:48

## 2022-01-01 RX ADMIN — LORAZEPAM 1 MG: 2 INJECTION INTRAMUSCULAR; INTRAVENOUS at 18:39

## 2022-01-01 RX ADMIN — SODIUM CHLORIDE: 9 INJECTION, SOLUTION INTRAVENOUS at 14:30

## 2022-01-01 RX ADMIN — BENZONATATE 100 MG: 100 CAPSULE ORAL at 09:11

## 2022-01-01 RX ADMIN — ENOXAPARIN SODIUM 30 MG: 100 INJECTION SUBCUTANEOUS at 08:22

## 2022-01-01 RX ADMIN — TOCILIZUMAB 600 MG: 20 INJECTION, SOLUTION, CONCENTRATE INTRAVENOUS at 11:35

## 2022-01-01 RX ADMIN — MORPHINE SULFATE 4 MG: 4 INJECTION, SOLUTION INTRAMUSCULAR; INTRAVENOUS at 15:16

## 2022-01-01 RX ADMIN — DESVENLAFAXINE 100 MG: 100 TABLET, EXTENDED RELEASE ORAL at 20:55

## 2022-01-01 RX ADMIN — BENZONATATE 100 MG: 100 CAPSULE ORAL at 14:48

## 2022-01-01 RX ADMIN — TRAMADOL HYDROCHLORIDE 50 MG: 50 TABLET, COATED ORAL at 21:27

## 2022-01-01 RX ADMIN — ENOXAPARIN SODIUM 30 MG: 100 INJECTION SUBCUTANEOUS at 21:29

## 2022-01-01 RX ADMIN — LORAZEPAM 1 MG: 2 INJECTION INTRAMUSCULAR; INTRAVENOUS at 00:43

## 2022-01-01 RX ADMIN — TRAMADOL HYDROCHLORIDE 50 MG: 50 TABLET, COATED ORAL at 19:23

## 2022-01-01 RX ADMIN — ENOXAPARIN SODIUM 30 MG: 100 INJECTION SUBCUTANEOUS at 08:17

## 2022-01-01 RX ADMIN — TRAMADOL HYDROCHLORIDE 50 MG: 50 TABLET, COATED ORAL at 15:01

## 2022-01-01 RX ADMIN — TRAMADOL HYDROCHLORIDE 50 MG: 50 TABLET, COATED ORAL at 05:58

## 2022-01-01 RX ADMIN — MORPHINE SULFATE 4 MG: 4 INJECTION, SOLUTION INTRAMUSCULAR; INTRAVENOUS at 08:12

## 2022-01-01 RX ADMIN — BENZONATATE 100 MG: 100 CAPSULE ORAL at 20:01

## 2022-01-01 RX ADMIN — BENZONATATE 100 MG: 100 CAPSULE ORAL at 21:27

## 2022-01-01 RX ADMIN — Medication 2 PUFF: at 15:54

## 2022-01-01 RX ADMIN — SERTRALINE 25 MG: 25 TABLET, FILM COATED ORAL at 09:07

## 2022-01-01 RX ADMIN — FAMOTIDINE 20 MG: 20 TABLET ORAL at 21:29

## 2022-01-01 RX ADMIN — MORPHINE SULFATE 2 MG: 2 INJECTION, SOLUTION INTRAMUSCULAR; INTRAVENOUS at 12:52

## 2022-01-01 RX ADMIN — CODEINE SULFATE 30 MG: 30 TABLET ORAL at 02:35

## 2022-01-01 RX ADMIN — GUAIFENESIN 600 MG: 600 TABLET, EXTENDED RELEASE ORAL at 20:25

## 2022-01-01 RX ADMIN — BENZONATATE 100 MG: 100 CAPSULE ORAL at 14:26

## 2022-01-01 RX ADMIN — BENZONATATE 100 MG: 100 CAPSULE ORAL at 13:04

## 2022-01-01 RX ADMIN — ONDANSETRON 4 MG: 2 INJECTION INTRAMUSCULAR; INTRAVENOUS at 15:01

## 2022-01-01 RX ADMIN — ALBUTEROL SULFATE 2 PUFF: 90 AEROSOL, METERED RESPIRATORY (INHALATION) at 09:15

## 2022-01-01 RX ADMIN — HYDROCODONE BITARTRATE AND ACETAMINOPHEN 1 TABLET: 5; 325 TABLET ORAL at 10:45

## 2022-01-01 RX ADMIN — DESVENLAFAXINE 100 MG: 100 TABLET, EXTENDED RELEASE ORAL at 20:25

## 2022-01-01 RX ADMIN — Medication 50 MG: at 09:08

## 2022-01-01 RX ADMIN — OXYCODONE HYDROCHLORIDE AND ACETAMINOPHEN 1000 MG: 500 TABLET ORAL at 08:33

## 2022-01-01 RX ADMIN — LORAZEPAM 1 MG: 2 INJECTION INTRAMUSCULAR; INTRAVENOUS at 09:26

## 2022-01-01 RX ADMIN — SODIUM CHLORIDE: 9 INJECTION, SOLUTION INTRAVENOUS at 22:08

## 2022-01-01 RX ADMIN — OXYCODONE HYDROCHLORIDE AND ACETAMINOPHEN 1000 MG: 500 TABLET ORAL at 07:58

## 2022-01-01 RX ADMIN — FAMOTIDINE 20 MG: 20 TABLET ORAL at 20:35

## 2022-01-01 ASSESSMENT — PAIN DESCRIPTION - LOCATION
LOCATION: HEAD
LOCATION: SHOULDER
LOCATION: HEAD
LOCATION: ARM
LOCATION: ARM
LOCATION: HEAD
LOCATION: ARM
LOCATION: HEAD;CHEST
LOCATION: GENERALIZED
LOCATION: ARM
LOCATION: HEAD
LOCATION: SHOULDER
LOCATION: HEAD

## 2022-01-01 ASSESSMENT — PAIN SCALES - GENERAL
PAINLEVEL_OUTOF10: 7
PAINLEVEL_OUTOF10: 4
PAINLEVEL_OUTOF10: 0
PAINLEVEL_OUTOF10: 2
PAINLEVEL_OUTOF10: 9
PAINLEVEL_OUTOF10: 7
PAINLEVEL_OUTOF10: 3
PAINLEVEL_OUTOF10: 0
PAINLEVEL_OUTOF10: 6
PAINLEVEL_OUTOF10: 0
PAINLEVEL_OUTOF10: 9
PAINLEVEL_OUTOF10: 7
PAINLEVEL_OUTOF10: 0
PAINLEVEL_OUTOF10: 4
PAINLEVEL_OUTOF10: 9
PAINLEVEL_OUTOF10: 3
PAINLEVEL_OUTOF10: 7
PAINLEVEL_OUTOF10: 6
PAINLEVEL_OUTOF10: 5
PAINLEVEL_OUTOF10: 4
PAINLEVEL_OUTOF10: 7
PAINLEVEL_OUTOF10: 3
PAINLEVEL_OUTOF10: 0
PAINLEVEL_OUTOF10: 0
PAINLEVEL_OUTOF10: 4
PAINLEVEL_OUTOF10: 4
PAINLEVEL_OUTOF10: 0
PAINLEVEL_OUTOF10: 8
PAINLEVEL_OUTOF10: 0
PAINLEVEL_OUTOF10: 7
PAINLEVEL_OUTOF10: 0
PAINLEVEL_OUTOF10: 7
PAINLEVEL_OUTOF10: 0
PAINLEVEL_OUTOF10: 2
PAINLEVEL_OUTOF10: 8
PAINLEVEL_OUTOF10: 9
PAINLEVEL_OUTOF10: 5
PAINLEVEL_OUTOF10: 0
PAINLEVEL_OUTOF10: 1
PAINLEVEL_OUTOF10: 9
PAINLEVEL_OUTOF10: 7
PAINLEVEL_OUTOF10: 8
PAINLEVEL_OUTOF10: 9
PAINLEVEL_OUTOF10: 0
PAINLEVEL_OUTOF10: 8
PAINLEVEL_OUTOF10: 0
PAINLEVEL_OUTOF10: 5
PAINLEVEL_OUTOF10: 8
PAINLEVEL_OUTOF10: 6
PAINLEVEL_OUTOF10: 7
PAINLEVEL_OUTOF10: 10
PAINLEVEL_OUTOF10: 10
PAINLEVEL_OUTOF10: 7
PAINLEVEL_OUTOF10: 8
PAINLEVEL_OUTOF10: 0
PAINLEVEL_OUTOF10: 9
PAINLEVEL_OUTOF10: 6
PAINLEVEL_OUTOF10: 3
PAINLEVEL_OUTOF10: 7
PAINLEVEL_OUTOF10: 4
PAINLEVEL_OUTOF10: 4
PAINLEVEL_OUTOF10: 5
PAINLEVEL_OUTOF10: 0
PAINLEVEL_OUTOF10: 5
PAINLEVEL_OUTOF10: 7
PAINLEVEL_OUTOF10: 0
PAINLEVEL_OUTOF10: 4
PAINLEVEL_OUTOF10: 8
PAINLEVEL_OUTOF10: 2
PAINLEVEL_OUTOF10: 3
PAINLEVEL_OUTOF10: 2
PAINLEVEL_OUTOF10: 10
PAINLEVEL_OUTOF10: 8
PAINLEVEL_OUTOF10: 2
PAINLEVEL_OUTOF10: 3
PAINLEVEL_OUTOF10: 4
PAINLEVEL_OUTOF10: 2
PAINLEVEL_OUTOF10: 7
PAINLEVEL_OUTOF10: 7
PAINLEVEL_OUTOF10: 8
PAINLEVEL_OUTOF10: 0
PAINLEVEL_OUTOF10: 3
PAINLEVEL_OUTOF10: 0
PAINLEVEL_OUTOF10: 7
PAINLEVEL_OUTOF10: 0
PAINLEVEL_OUTOF10: 7
PAINLEVEL_OUTOF10: 2
PAINLEVEL_OUTOF10: 8
PAINLEVEL_OUTOF10: 0
PAINLEVEL_OUTOF10: 7
PAINLEVEL_OUTOF10: 8
PAINLEVEL_OUTOF10: 8
PAINLEVEL_OUTOF10: 10
PAINLEVEL_OUTOF10: 4
PAINLEVEL_OUTOF10: 8
PAINLEVEL_OUTOF10: 10
PAINLEVEL_OUTOF10: 8
PAINLEVEL_OUTOF10: 0
PAINLEVEL_OUTOF10: 8
PAINLEVEL_OUTOF10: 8
PAINLEVEL_OUTOF10: 5
PAINLEVEL_OUTOF10: 10
PAINLEVEL_OUTOF10: 0
PAINLEVEL_OUTOF10: 0
PAINLEVEL_OUTOF10: 4
PAINLEVEL_OUTOF10: 0
PAINLEVEL_OUTOF10: 0
PAINLEVEL_OUTOF10: 7
PAINLEVEL_OUTOF10: 0
PAINLEVEL_OUTOF10: 7
PAINLEVEL_OUTOF10: 7
PAINLEVEL_OUTOF10: 0
PAINLEVEL_OUTOF10: 5
PAINLEVEL_OUTOF10: 9
PAINLEVEL_OUTOF10: 0
PAINLEVEL_OUTOF10: 0
PAINLEVEL_OUTOF10: 2

## 2022-01-01 ASSESSMENT — PAIN DESCRIPTION - PROGRESSION
CLINICAL_PROGRESSION: GRADUALLY IMPROVING
CLINICAL_PROGRESSION: GRADUALLY IMPROVING
CLINICAL_PROGRESSION: NOT CHANGED
CLINICAL_PROGRESSION: GRADUALLY WORSENING
CLINICAL_PROGRESSION: NOT CHANGED
CLINICAL_PROGRESSION: GRADUALLY WORSENING
CLINICAL_PROGRESSION: GRADUALLY WORSENING
CLINICAL_PROGRESSION: GRADUALLY IMPROVING

## 2022-01-01 ASSESSMENT — PAIN DESCRIPTION - FREQUENCY
FREQUENCY: CONTINUOUS
FREQUENCY: INTERMITTENT
FREQUENCY: CONTINUOUS
FREQUENCY: INTERMITTENT
FREQUENCY: INTERMITTENT
FREQUENCY: CONTINUOUS
FREQUENCY: INTERMITTENT
FREQUENCY: INTERMITTENT
FREQUENCY: CONTINUOUS

## 2022-01-01 ASSESSMENT — ENCOUNTER SYMPTOMS
EYE PAIN: 0
CONSTIPATION: 0
SHORTNESS OF BREATH: 1
COUGH: 1
CONSTIPATION: 0
TROUBLE SWALLOWING: 0
BLOOD IN STOOL: 0
COUGH: 0
NAUSEA: 0
DIARRHEA: 0
NAUSEA: 0
ABDOMINAL PAIN: 0
DIARRHEA: 0
SHORTNESS OF BREATH: 1
TROUBLE SWALLOWING: 0
NAUSEA: 0
CONSTIPATION: 0
COUGH: 1
ABDOMINAL PAIN: 0
DIARRHEA: 0
TROUBLE SWALLOWING: 0
VOMITING: 0
COUGH: 1
ABDOMINAL PAIN: 0
NAUSEA: 0
SHORTNESS OF BREATH: 1
SHORTNESS OF BREATH: 1
VOMITING: 0
NAUSEA: 0
TROUBLE SWALLOWING: 0
DIARRHEA: 0
VOMITING: 0
COUGH: 1
ABDOMINAL PAIN: 0
VOMITING: 0
BLOOD IN STOOL: 0
EYE PAIN: 0
NAUSEA: 0
COUGH: 1
EYE PAIN: 0
BLOOD IN STOOL: 0
SHORTNESS OF BREATH: 1
EYE PAIN: 0
CONSTIPATION: 0
ABDOMINAL PAIN: 0
DIARRHEA: 0
ABDOMINAL PAIN: 0
SHORTNESS OF BREATH: 1
TROUBLE SWALLOWING: 0
BLOOD IN STOOL: 0
COUGH: 1
BLOOD IN STOOL: 0
VOMITING: 0
SHORTNESS OF BREATH: 1
TROUBLE SWALLOWING: 0
BLOOD IN STOOL: 0
VOMITING: 0
BLOOD IN STOOL: 0
EYE PAIN: 0
ABDOMINAL PAIN: 0
EYE PAIN: 0
CONSTIPATION: 0
DIARRHEA: 0
VOMITING: 0
NAUSEA: 0
DIARRHEA: 0
CONSTIPATION: 0
CONSTIPATION: 0
EYE PAIN: 0
TROUBLE SWALLOWING: 0

## 2022-01-01 ASSESSMENT — PAIN DESCRIPTION - ORIENTATION
ORIENTATION: RIGHT
ORIENTATION: MID
ORIENTATION: RIGHT
ORIENTATION: RIGHT;LEFT
ORIENTATION: RIGHT
ORIENTATION: RIGHT

## 2022-01-01 ASSESSMENT — PAIN DESCRIPTION - DESCRIPTORS
DESCRIPTORS: ACHING
DESCRIPTORS: ACHING
DESCRIPTORS: CONSTANT
DESCRIPTORS: ACHING;SORE
DESCRIPTORS: ACHING
DESCRIPTORS: ACHING;SORE;DISCOMFORT
DESCRIPTORS: ACHING;SORE
DESCRIPTORS: ACHING;SORE
DESCRIPTORS: ACHING
DESCRIPTORS: HEADACHE
DESCRIPTORS: ACHING

## 2022-01-01 ASSESSMENT — PAIN DESCRIPTION - PAIN TYPE
TYPE: ACUTE PAIN

## 2022-01-01 ASSESSMENT — PAIN DESCRIPTION - ONSET
ONSET: AWAKENED FROM SLEEP
ONSET: ON-GOING
ONSET: GRADUAL
ONSET: AWAKENED FROM SLEEP
ONSET: ON-GOING

## 2022-01-01 ASSESSMENT — PAIN - FUNCTIONAL ASSESSMENT
PAIN_FUNCTIONAL_ASSESSMENT: PREVENTS OR INTERFERES SOME ACTIVE ACTIVITIES AND ADLS
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
## 2022-01-01 NOTE — ED NOTES
ED to inpatient nurses report    Chief Complaint   Patient presents with    Shortness of Breath      Present to ED from home  LOC: alert and orientated to name, place, date  Vital signs   Vitals:    12/31/21 1548 12/31/21 1640 12/31/21 1729 12/31/21 1909   BP: 110/77 101/62 104/69 105/70   Pulse: 113 107 103 105   Resp: 24 24 22 20   Temp: 99.5 °F (37.5 °C)      TempSrc: Oral      SpO2: 98% 97% 95% 97%   Weight: 183 lb (83 kg)      Height: 5' 7\" (1.702 m)         Oxygen Baseline room air    Current needs required 6lpm viz vc Bipap/Cpap No  LDAs:   Peripheral IV 12/31/21 Left Antecubital (Active)   Site Assessment Clean;Dry; Intact 12/31/21 1906   Line Status Normal saline locked 12/31/21 1910   Dressing Status Clean;Dry; Intact 12/31/21 1910     Mobility: Requires assistance * 1  Pending ED orders: NA  Present condition: Pt resting on cot in position of comfort. Pt remains A&Ox4, resps tachypneic. Pt remains on 6LPM via nc. IV shows no s/s of infection or infiltration.    Person of contract, phone number   Our promise was given to patient    Electronically signed by Joann Cannon RN on 12/31/2021 at 8:15 PM       Joann Cannon RN  12/31/21 2016

## 2022-01-01 NOTE — ED NOTES
In for hourly rounding. Pt resting on cot in position of comfort. Pt remains A&Ox4, resps easy and unlabored. IV shows no s/s of infection or infiltration. Pt pain improved, chest pain remains the same at this time. Monitor remains in place. Updated pt on POC. Will monitor.      Deborah Ying RN  12/31/21 2012

## 2022-01-01 NOTE — ED NOTES
In for hourly rounding. Pt resting on cot in position of comfort. Pt remains A&Ox4, resps easy and unlabored. IV shows no s/s of infection or infiltration. Pt pain returns in head, chest pain remains unchanged at this time. Medicated pt per MAR. Monitor remains in place. Updated pt and family member at bedside on POC. Will monitor.      Eric Valencia RN  12/31/21 2014

## 2022-01-01 NOTE — RT PROTOCOL NOTE
RT Inhaler-Nebulizer Bronchodilator Protocol Note    There is a bronchodilator order in the chart from a provider indicating to follow the RT Bronchodilator Protocol and there is an Initiate RT Inhaler-Nebulizer Bronchodilator Protocol order as well (see protocol at bottom of note). CXR Findings:  XR CHEST PORTABLE    Result Date: 12/31/2021  Pneumonic infiltrates are scattered throughout the lungs bilaterally. **This report has been created using voice recognition software. It may contain minor errors which are inherent in voice recognition technology. ** Final report electronically signed by Dr Manuela Zhang on 12/31/2021 4:05 PM      The findings from the last RT Protocol Assessment were as follows:   History Pulmonary Disease: None or smoker <15 pack years  Respiratory Pattern: Dyspnea on exertion or RR 21-25 bpm  Breath Sounds: Slightly diminished and/or crackles  Cough: Strong, spontaneous, non-productive  Indication for Bronchodilator Therapy: Decreased or absent breath sounds  Bronchodilator Assessment Score: 4    Aerosolized bronchodilator medication orders have been revised according to the RT Inhaler-Nebulizer Bronchodilator Protocol below. Respiratory Therapist to perform RT Therapy Protocol Assessment initially then follow the protocol. Repeat RT Therapy Protocol Assessment PRN for score 0-3 or on second treatment, BID, and PRN for scores above 3. No Indications - adjust the frequency to every 6 hours PRN wheezing or bronchospasm, if no treatments needed after 48 hours then discontinue using Per Protocol order mode. If indication present, adjust the RT bronchodilator orders based on the Bronchodilator Assessment Score as indicated below.   Use Inhaler orders unless patient has one or more of the following: on home nebulizer, not able to hold breath for 10 seconds, is not alert and oriented, cannot activate and use MDI correctly, or respiratory rate 25 breaths per minute or more, then use the equivalent nebulizer order(s) with same Frequency and PRN reasons based on the score. If a patient is on this medication at home then do not decrease Frequency below that used at home. 0-3 - enter or revise RT bronchodilator order(s) to equivalent RT Bronchodilator order with Frequency of every 4 hours PRN for wheezing or increased work of breathing using Per Protocol order mode. 4-6 - enter or revise RT Bronchodilator order(s) to two equivalent RT bronchodilator orders with one order with BID Frequency and one order with Frequency of every 4 hours PRN wheezing or increased work of breathing using Per Protocol order mode. 7-10 - enter or revise RT Bronchodilator order(s) to two equivalent RT bronchodilator orders with one order with TID Frequency and one order with Frequency of every 4 hours PRN wheezing or increased work of breathing using Per Protocol order mode. 11-13 - enter or revise RT Bronchodilator order(s) to one equivalent RT bronchodilator order with QID Frequency and an Albuterol order with Frequency of every 4 hours PRN wheezing or increased work of breathing using Per Protocol order mode. Greater than 13 - enter or revise RT Bronchodilator order(s) to one equivalent RT bronchodilator order with every 4 hours Frequency and an Albuterol order with Frequency of every 2 hours PRN wheezing or increased work of breathing using Per Protocol order mode. RT to enter RT Home Evaluation for COPD & MDI Assessment order using Per Protocol order mode.     Electronically signed by Walker Gonzalez RCP on 1/1/2022 at 9:14 AM

## 2022-01-01 NOTE — ED NOTES
In for hourly rounding. Pt resting on cot in position of comfort. Pt remains A&Ox4. Pt continues on 6LPM via nc, tachypneic. IV shows no s/s of infection or infiltration. Pt pain remains unchanged at this time. Medicated pt per MAR. Monitor remains in place. Updated pt on POC. Will monitor.      Renard Benson RN  12/31/21 2012
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
## 2022-01-03 NOTE — CARE COORDINATION
1/3/22, 11:06 AM EST  DISCHARGE PLANNING EVALUATION:    Stevie Johnston       [facility]   Location: -16/016-A Reason for admit: Acute respiratory failure due to COVID-19 ([facility] [address] [U07.1, J96.00]   PMH:  has a past medical history of Anxiety and PTSD (post-traumatic stress disorder). Procedure:   12/31 CXR: Pneumonic infiltrates are scattered throughout the lungs bilaterally. 12/31 CTA chest: No PE. Bilateral groundglass airspace infiltrates. Barriers to Discharge:  + covid. Hospitalist following. Requiring HFO 60%, 50L. Sats dropping into 80s with coughing. IVF. Inhaler. IV decadron. Tessalon. Vit C, D, zinc. Did receive Tocilizumab on 1/2. PCP: No primary care provider on file. Readmission Risk Score: 9.8 ( )%    Patient Goals/Plan/Treatment Preferences: Spoke with son, Hilda Stewart while on unit, explained role of CM. Lb Jalloh is from home with her  who is also admitted to . Following for needs. Transportation/Food Security/Housekeeping Addressed:  No issues identified.

## 2022-01-03 NOTE — PROGRESS NOTES
[facility] Palliative Care           Progress Note    Patient Name:  Dyana Armendariz  Medical Record Number:  [id number]  YOB: 1965    Date:  1/3/2022  Time:  2:13 PM  Completed By:  Johnathan Mckenzie RN, RN    Reason for Palliative Care Evaluation:  Code status     Current Issues:  []  Pain  []  Fatigue  []  Nausea  []  Anxiety  []  Depression  [x]  Shortness of Breath  []  Constipation  []  Appetite  []  Other:    Advance Directives  [] [facility] DNR Form  [] Living Will  [] Medical POA    Current Code Status  [x] Full Resuscitation  [] DNR-Comfort Care-Arrest  [] DNR-Comfort Care  [] Limited   [] No CPR   [] No shock   [] No ET intubation/reintubation   [] No resuscitative medications   [] Other limitation:    Performance Status:  Palliative Performance Scale:  ___70%  Ambulation reduced; Some disease; Can't do normal job or work; intake normal or reduced; can do full self care; LOC full  ___60%  Ambulation reduced; Significant disease; Can't do hobbies/housework; intake normal or reduced; occasional assist; LOC full/confusion  ___50%  Mainly sit/lie; Extensive disease; Can't do any work; Considerable assist; intake normal or reduced; LOC full/confusion  _X_40%  Mainly in bed; Extensive disease; Mainly assist; intake normal or reduced; LOC full/confusion   ___30%  Bed Bound; Extensive disease; Total care; intake reduced; LOC full/confusion  ___20%  Bed Bound; Extensive disease; Total care; intake minimal; Drowsy/coma  ___10%  Bed Bound; Extensive disease; Total care; Mouth care only; Drowsy/coma  ___0       Death    Family/Patient Discussion:  Consult received for code status discussion. In room to speak with patient. Patient resting in bed, high flow in place. Patient able to inform this RN that she was beginning to feel better than when admitted. Educated patient on code status.  Including FULL code with inutbation, defibrillation, resuscitative medications, and chest compressions with possible rib fx and organ damage. Patient stated \"I am not sure I want all that\". Supported patient that she could have time to make decision. Patient stated she did not think she would want intubated. Educated patient on Limited X4 code status which would continue to include aggressive treatments. patient agreed that is what she would want. Patient denied further questions. Asked patient if this Rn could update any family for her. Patient stated her son, Eze Singh, had been following up with the floor nurses. Patient stated her  was also admitted to  and her daughter has CF so she was not visiting them in the hospital. Emotional support provided. Informed patient that this RN would update Kye, patient agreed. Call made to Kye. Updated him on conversation. Kye stated he was not surprised by patient's decision, no matter how hard it was for him to hear. Support given. Kye stated the patient has PTSD and anxiety issues when it comes to hospitalizations. Informed Kye that if it came to a time where the patient was no longer able to be supported by current medical treatment patient would be able to change to comfort care and have medications to ease SOB and anxiety. Kye stated he understood, denied further questions at this time.     Perfect serve message sent to Dr Joseline Ibarra    Plan/Follow-Up:  Change to LIMITED NO X4    Jody Mario, RN, RN  1/3/2022,  2:13 PM

## 2022-01-03 NOTE — PROGRESS NOTES
Physician Progress Note      PATIENT:               Vika García  CSN #:                  [id number]  :                       1965  ADMIT DATE:       2021 3:43 PM  [address] [place] DATE:  RESPONDING  PROVIDER #:        Yajaira Zimmerman DO          QUERY TEXT:    Pt admitted with covid. Pt noted to have CXR with Pneumonic infiltrates are   scattered throughout the lungs bilaterally. If possible, please document in   the progress notes and discharge summary if you are evaluating and/or treating   any of the following: The medical record reflects the following:    Risk Factors: Covid  Clinical Indicators: CXR: Pneumonic infiltrates are scattered throughout the   lungs bilaterally, PCT 0.08, 0.17  Treatment: Decadron, Barcitinib, Tocilizumab x1, Vitamin C, D and Zinc    Thank you! Panda Yanez CRCR  RN Clinical   P: [phone]  Options provided:  -- Pneumonia due to Covid 19  -- Bacterial pneumonia  -- Other - I will add my own diagnosis  -- Disagree - Not applicable / Not valid  -- Disagree - Clinically unable to determine / Unknown  -- Refer to Clinical Documentation Reviewer    PROVIDER RESPONSE TEXT:    This patient has pneumonia due to Covid 19.     Query created by: Sharath Harrison on 1/3/2022 9:53 AM      Electronically signed by:  Yajaira Zimmerman DO 1/3/2022 12:19 PM

## 2022-01-03 NOTE — PROGRESS NOTES
PROGRESS NOTE      Patient:  Ru Leone      Unit/Bed:6A-16/016-A    YOB: 1965    MRN: [id number]       Acct: [de-identified]     PCP: No primary care provider on file. Date of Admission: 12/31/2021    Assessment/Plan:    Anticipated Discharge in : pending course    Active Hospital Problems    Diagnosis Date Noted    Acute respiratory failure due to COVID-19 [facility]) [U07.1, J96.00] 12/31/2021     Acute hypoxic respiratory failure due to COVID-19:   Continue albuterol  Heated high flow nasal canula  Mucinex, vitamin C, D and zinc  Continue incentive spirometry, Acapella  Decadron high dose 20 mg daily for 5 days followed by 10mg daily for 5 days  Tocilizumab - one time dose already given  Wean O2 as tollerated    Pneumonia due to COVID-19  See treatment above    Hypo-osmolar hyponatremia-resolved  Current Na: 141  Monitor labs    Leukopenia   Likely secondary to COVID-19 viral infection  Possible complication due to steroid use  Trend with CBC    Thrombocytopenia  - improving  Likely secondary to viral infection  Trend CBC    Depression  Continue home Desvenlafaxine succinate    Cough  Unsuccessful treatment with Tessalon pearls  Codeine 30mg q6hrs PRN    Chief Complaint: increasing shortness of breath    Hospital Course:   From HPI:  \"61year-old obese white female presents emergency department with chest pain, shortness of breath and generalized myalgia. Patient is complaining of sore throat patient has been exposed to her spouse who was positive for Covid. Patient is not immunized against Covid. Patient does not have any nausea vomiting or sweating. Does not have any diarrhea or constipation. Patient's past medical history includes depression. \"    01/03/2021  Codeine 30mg for cough    Subjective: Son present during visit. Patient short of breath, she believes she may be improving today. Complaining of cough despite tessalon pearls. Treatment discussed with patient and her son.   Patient 8.2* 8.1*     Recent Labs     12/31/21  1553   AST 33   ALT 23   BILITOT 0.4   ALKPHOS 78     No results for input(s): INR in the last 72 hours. No results for input(s): [place] in the last 72 hours. Urinalysis:    No results found for: Cherelle Johnson, BACTERIA, RBCUA, BLOODU, SPECGRAV, [address] [place] [address]    Radiology:  CTA CHEST W WO CONTRAST   Final Result    No PE. Bilateral groundglass airspace infiltrates. **This report has been created using voice recognition software. It may contain minor errors which are inherent in voice recognition technology. **      Final report electronically signed by Dr. Emir Yen on 12/31/2021 7:33 PM      XR CHEST PORTABLE   Final Result   Pneumonic infiltrates are scattered throughout the lungs bilaterally. **This report has been created using voice recognition software. It may contain minor errors which are inherent in voice recognition technology. **      Final report electronically signed by Dr Jessica Valdivia on 12/31/2021 4:05 PM          Diet: ADULT DIET; Regular; 4 carb choices (60 gm/meal)    DVT prophylaxis: [x] Lovenox                                 [] SCDs                                 [] SQ Heparin                                 [] Encourage ambulation           [] Already on Anticoagulation     Disposition:    [x] Home       [] TCU       [] Rehab       [] Psych       [] SNF       [] [place]       [] Other-    Code Status: Full Code      Electronically signed by Henna Gallegos MD on 1/3/2022 at 7:26 AM    This note was electronically signed. Parts of this note may have been dictated by use of voice recognition software and electronically transcribed. The note may contain errors not detected in proofreading. Please refer to my supervising physician's documentation if my documentation differs.

## 2022-01-04 NOTE — FLOWSHEET NOTE
01/04/22 0114   Encounter Summary   Services provided to: Family   Continue Visiting Yes  (1/4 Ph-F )   Complexity of Encounter Moderate   Length of Encounter 30 minutes   Spiritual/[facility]   Type Spiritual support   The  first attempted to have a conversation with the patient but she was resting. I then contacted the patients family (Pt's son Saira Samuel) via phone conversation. The pt's son had questions pertaining to a financial POA and not a [facility]. He also shared that his father (the pt's - Reanna Albert) is also admitted as a COVIID patient. I provided prayer, emotional support and words of comfort. I also wanted to see if the patients family had any additional spiritual needs. There were none at this time.

## 2022-01-04 NOTE — PROGRESS NOTES
PROGRESS NOTE      Patient:  Priya Juarez      Unit/Bed:6A-16/016-A    YOB: 1965    MRN: [id number]       Acct: [de-identified]     PCP: No primary care provider on file. Date of Admission: 12/31/2021    Assessment/Plan:    Anticipated Discharge in: pending course    Active Hospital Problems    Diagnosis Date Noted    Acute respiratory failure due to COVID-19 [facility]) [U07.1, J96.00] 12/31/2021     Acute hypoxic respiratory failure due to COVID-19:   Continue albuterol  Heated high flow nasal canula  Mucinex, vitamin C, Vit D and zinc  Continue incentive spirometry, Acapella  Decadron high dose 20 mg daily for 5 days followed by 10mg daily for 5 days  Tocilizumab - one time dose already given  Wean O2 as tollerated    Pneumonia due to COVID-19  See treatment above    Hypo-osmolar hyponatremia-resolved  Continue to monitor daily labs    Leukopenia   Likely secondary to COVID-19 viral infection  Possible complication due to steroid use  Trend with CBC    Thrombocytopenia  - improving  Likely secondary to viral infection  Platelets: 944 on 60/65/8760  Trending    Depression  Patient on max dose of desvenlafaxine    Cough  Unsuccessful treatment with Tessalon pearls  Codeine 30mg q6hrs PRN    Chief Complaint: increasing shortness of breath    Hospital Course:   From HPI:  \"61year-old obese white female presents emergency department with chest pain, shortness of breath and generalized myalgia. Patient is complaining of sore throat patient has been exposed to her spouse who was positive for Covid. Patient is not immunized against Covid. Patient does not have any nausea vomiting or sweating. Does not have any diarrhea or constipation. Patient's past medical history includes depression. \"    01/03/2021  Codeine 30mg for cough with improvement  Tramadol added for pain control    01/04/2021  Increased oxygen support required, high flow NC increased to 60 L/min with SpO2 at 93  Encourage incentive spirometry and acapella as able. Subjective: Son present during visit again today. States she has a phobia and PTSD of masks, both having them on her face and seeing them on others. Patient remains short of breath and stated twice she is unsure how much longer she can fight. Patient changed Code status yesterday to Limited no x4, no intubation, however if needs BiPAP patient requesting medication for anxiety. Cough improving with codeine, still present. Pain controlled with tramadol. Treatment plan again discussed with patient and her son. Will request palliative care come to discuss hospice options and medical decision making if she gets to that point. Explained patient still has room to work with high flow oxygen before a masked oxygen treatment would be needed. Encouraged incentive spirometry and acapella as able. Patient denies chest pain, fever, chills, abdominal discomfort, nausea, vomiting, constipation, diarrhea, syncope.     Review of Systems    Medications:  Reviewed    Infusion Medications    dextrose      sodium chloride 25 mL (01/02/22 1048)    sodium chloride 75 mL/hr at 01/03/22 2123     Scheduled Medications    benzonatate  100 mg Oral 3 times per day    dexamethasone  20 mg IntraVENous Daily    [START ON 1/7/2022] dexamethasone  10 mg IntraVENous Daily    albuterol sulfate HFA  2 puff Inhalation BID    desvenlafaxine succinate  100 mg Oral Daily    sodium chloride flush  5-40 mL IntraVENous 2 times per day    enoxaparin  30 mg SubCUTAneous BID    famotidine  20 mg Oral BID    insulin lispro  0-6 Units SubCUTAneous TID WC    insulin lispro  0-3 Units SubCUTAneous Nightly    Vitamin D  2,000 Units Oral Daily    ascorbic acid  1,000 mg Oral Daily    zinc sulfate  50 mg Oral Daily    guaiFENesin  600 mg Oral BID     PRN Meds: codeine, albuterol sulfate HFA, traZODone, traMADol, glucose, dextrose, glucagon (rDNA), dextrose, sodium chloride flush, sodium chloride, ondansetron **OR** ondansetron, polyethylene glycol, acetaminophen **OR** acetaminophen, magnesium sulfate, potassium chloride      Intake/Output Summary (Last 24 hours) at 1/4/2022 7927  Last data filed at 1/4/2022 0341  Gross per 24 hour   Intake 699.2 ml   Output 0 ml   Net 699.2 ml       Diet:  ADULT DIET; Regular; 4 carb choices (60 gm/meal)    Exam:  /69   Pulse 77   Temp 97.2 °F (36.2 °C) (Oral)   Resp 18   Ht 5' 7\" (1.702 m)   Wt 192 lb 12.8 oz (87.5 kg)   SpO2 97%   BMI 30.20 kg/m²     Physical Exam  Vitals and nursing note reviewed. Constitutional:       Appearance: She is well-developed. She is ill-appearing. She is not diaphoretic. Comments: Appears short of breath and tired. Not talking much. HENT:      Head: Normocephalic and atraumatic. Right Ear: External ear normal.      Left Ear: External ear normal.      Nose: Nose normal.   Eyes:      General: No scleral icterus. Right eye: No discharge. Left eye: No discharge. Conjunctiva/sclera: Conjunctivae normal.   Cardiovascular:      Rate and Rhythm: Normal rate and regular rhythm. Heart sounds: Normal heart sounds. No murmur heard. Pulmonary:      Breath sounds: Normal breath sounds. Comments: Increased effort with decreased breath sounds. Abdominal:      General: Bowel sounds are normal. There is no distension. Palpations: Abdomen is soft. Tenderness: There is no abdominal tenderness. Musculoskeletal:      Cervical back: Normal range of motion. Skin:     General: Skin is warm and dry. Findings: No erythema or rash. Neurological:      Mental Status: She is alert and oriented to person, place, and time. Cranial Nerves: No cranial nerve deficit. Psychiatric:         Behavior: Behavior normal.         Thought Content:  Thought content normal.         Judgment: Judgment normal.      Comments: Appears depressed         Labs:   Recent Labs     01/02/22  0705 01/03/22  0732   WBC 3.6* 3.3*   HGB 12.8 13.3   HCT 40.3 41.7   * 116*     Recent Labs     01/02/22  0705 01/03/22  0732    141   K 4.5 3.9    105   CO2 25 24   BUN 10 10   CREATININE 0.7 0.7   CALCIUM 8.1* 8.1*     No results for input(s): AST, ALT, BILIDIR, BILITOT, ALKPHOS in the last 72 hours. No results for input(s): INR in the last 72 hours. No results for input(s): Evonnie Montane in the last 72 hours. Urinalysis:    No results found for: Nikolas Kevin, BACTERIA, RBCUA, BLOODU, SPECGRAV, [place] [address]    Radiology:  CTA CHEST W WO CONTRAST   Final Result    No PE. Bilateral groundglass airspace infiltrates. **This report has been created using voice recognition software. It may contain minor errors which are inherent in voice recognition technology. **      Final report electronically signed by Dr. Jacob Royal on 12/31/2021 7:33 PM      XR CHEST PORTABLE   Final Result   Pneumonic infiltrates are scattered throughout the lungs bilaterally. **This report has been created using voice recognition software. It may contain minor errors which are inherent in voice recognition technology. **      Final report electronically signed by Dr Soraya Santamaria on 12/31/2021 4:05 PM          Diet: ADULT DIET; Regular; 4 carb choices (60 gm/meal)    DVT prophylaxis: [x] Lovenox                                 [] SCDs                                 [] SQ Heparin                                 [] Encourage ambulation           [] Already on Anticoagulation     Disposition:    [x] Home       [] TCU       [] Rehab       [] Psych       [] SNF       [] [facility]       [] Other-    Code Status: Limited      Electronically signed by Michael Killian MD on 1/4/2022 at 7:22 AM    This note was electronically signed. Parts of this note may have been dictated by use of voice recognition software and electronically transcribed.  The note may contain errors not detected in proofreading. Please refer to my supervising physician's documentation if my documentation differs.

## 2022-01-04 NOTE — CARE COORDINATION
Discharge Planning Update:     Hospitalist following for COVID. Remains on high flow O2, 50L and 50% FiO2, sats dropped to low 80's. Increased high flow to 60L and 60% FiO2. Code status changed to Limited x4. Palliative Care following. IVF. Vit C. Tessalon tid, Decadron 20mg iv daily, Lovenox, Mucinex bid, diabetes management, nausea control, pain control, Vit D, Zinc.     Patient Goals/Plan/Treatment Preferences: Spoke with pt's son, Ulices Manzano, on the unit. He is unsure of discharge plans for the pt. Explained that with Alycia's  (another pt on 6A), needing to go to SNF that we can consider to send Monique Rojo to same SNF if she needs it when she improves. Explained that no decision needed to be made today. Verbalized understanding. Ulices Manzano did request to meet with someone to assist Monique Rojo with MPOA and advance directives. Order placed for Spiritual Care.

## 2022-01-05 PROBLEM — J12.82 PNEUMONIA DUE TO COVID-19 VIRUS: Status: ACTIVE | Noted: 2021-01-01

## 2022-01-05 NOTE — PROGRESS NOTES
PROGRESS NOTE      Patient:  Sarai Newells      Unit/Bed:6A-16/016-A    YOB: 1965    MRN: [id number]       Acct: [de-identified]     PCP: No primary care provider on file. Date of Admission: 12/31/2021    Assessment/Plan:    Anticipated Discharge in: pending course    Active Hospital Problems    Diagnosis Date Noted    Acute respiratory failure due to COVID-19 [facility]) [U07.1, J96.00] 12/31/2021     Acute hypoxic respiratory failure due to COVID-19:   Continue albuterol  Heated high flow nasal canula  Mucinex, vitamin C, Vit D and zinc  Continue incentive spirometry, Acapella  Decadron high dose 20 mg daily for 5 days followed by 10mg daily for 5 days  Tocilizumab - one time dose already given  Wean O2 as tolerated  Phenergan for nausea    Pneumonia due to COVID-19  See treatment above    Hypo-osmolar hyponatremia-resolved  Continue to monitor daily labs    Leukopenia   Likely secondary to COVID-19 viral infection  Possible complication due to steroid use  Trend with CBC    Thrombocytopenia - improving  Likely secondary to viral infection  Platelets: 386 on 98/18/7753  Trending    Depression  Patient on max dose of desvenlafaxine  Added sertraline    Cough  Guaifenesin-dextromethorphan for cough  Codeine 30mg q4hrs PRN  Tessalon pearls    Chief Complaint: increasing shortness of breath    Hospital Course:   From HPI:  \"61year-old obese white female presents emergency department with chest pain, shortness of breath and generalized myalgia. Patient is complaining of sore throat patient has been exposed to her spouse who was positive for Covid. Patient is not immunized against Covid. Patient does not have any nausea vomiting or sweating. Does not have any diarrhea or constipation. Patient's past medical history includes depression. \"    01/03/2021  Codeine 30mg for cough with improvement  Tramadol added for pain control    01/04/2021  Increased oxygen support required, high flow NC increased to 60 L/min with SpO2 at 93  Encourage incentive spirometry and acapella as able. 01/04/2021  Added sertraline for worsening depression  Phenergan for nausea  Switched guaifenesin to guaifenesin-dextromethorphan for cough  Codeine switched from q6 to q4 hrs for cough    Subjective:  Patient seen and evaluated today. Patient sitting up in bed states she feels better today compared to yesterday and in good spirits. She feels the Zoloft has helped her mood. She states she still has a cough but believes codeine and guaifenesin-dextromethorphan has improved her cough. Patient also believes the Phenergan shot was helpful as she has eaten more today than at any point being admitted to the hospital, this was also noted by the nurse. Patient did not like the Phenergan shot due to pain in the arm. Continued encouragement of incentive spirometry and acapella. Patient denies chest pain, fever, chills, abdominal discomfort, nausea, vomiting, constipation, diarrhea, syncope. Review of Systems   Constitutional: Negative for chills, fatigue and fever. Improved appetite   HENT: Negative for ear pain, postnasal drip and trouble swallowing. Eyes: Negative for pain and visual disturbance. Respiratory: Positive for shortness of breath. Negative for cough. Cardiovascular: Negative for chest pain and palpitations. Gastrointestinal: Negative for abdominal pain, blood in stool, constipation, diarrhea, nausea and vomiting. Genitourinary: Negative for dysuria. Skin: Negative for rash. Neurological: Negative for headaches.    Psychiatric/Behavioral:        She believes she is feeling much better and less depressed       Medications:  Reviewed    Infusion Medications    dextrose      sodium chloride 25 mL (01/02/22 1048)    sodium chloride 75 mL/hr at 01/04/22 0747     Scheduled Medications    sertraline  25 mg Oral Daily    benzonatate  100 mg Oral 3 times per day    dexamethasone  20 mg IntraVENous Daily  [START ON 1/7/2022] dexamethasone  10 mg IntraVENous Daily    albuterol sulfate HFA  2 puff Inhalation BID    desvenlafaxine succinate  100 mg Oral Daily    sodium chloride flush  5-40 mL IntraVENous 2 times per day    enoxaparin  30 mg SubCUTAneous BID    famotidine  20 mg Oral BID    insulin lispro  0-6 Units SubCUTAneous TID WC    insulin lispro  0-3 Units SubCUTAneous Nightly    Vitamin D  2,000 Units Oral Daily    ascorbic acid  1,000 mg Oral Daily    zinc sulfate  50 mg Oral Daily    guaiFENesin  600 mg Oral BID     PRN Meds: calcium carbonate, codeine, albuterol sulfate HFA, traZODone, traMADol, glucose, dextrose, glucagon (rDNA), dextrose, sodium chloride flush, sodium chloride, ondansetron **OR** ondansetron, polyethylene glycol, acetaminophen **OR** acetaminophen, magnesium sulfate, potassium chloride      Intake/Output Summary (Last 24 hours) at 1/5/2022 0716  Last data filed at 1/4/2022 2216  Gross per 24 hour   Intake 200 ml   Output --   Net 200 ml       Diet:  ADULT DIET; Regular; 4 carb choices (60 gm/meal)    Exam:  BP (!) 112/93   Pulse 80   Temp 98.5 °F (36.9 °C) (Oral)   Resp 22   Ht 5' 7\" (1.702 m)   Wt 192 lb 12.8 oz (87.5 kg)   SpO2 97%   BMI 30.20 kg/m²     Physical Exam  Vitals and nursing note reviewed. Constitutional:       Appearance: She is well-developed. She is ill-appearing. She is not diaphoretic. Comments: Patient appears much happier and less ill. Patient still short of breath however was able to carry a full conversation today and answer questions in full sentences. Patient smiling during exam and appeared to be much happier. HENT:      Head: Normocephalic and atraumatic. Right Ear: External ear normal.      Left Ear: External ear normal.      Nose: Nose normal.   Eyes:      General: No scleral icterus. Right eye: No discharge. Left eye: No discharge.       Conjunctiva/sclera: Conjunctivae normal.   Cardiovascular:      Rate and Rhythm: Normal rate and regular rhythm. Heart sounds: Normal heart sounds. No murmur heard. Pulmonary:      Breath sounds: Normal breath sounds. Comments: Increased effort, however much improved breath sounds and inspiration. Abdominal:      General: Bowel sounds are normal. There is no distension. Palpations: Abdomen is soft. Tenderness: There is no abdominal tenderness. Musculoskeletal:      Cervical back: Normal range of motion. Skin:     General: Skin is warm and dry. Findings: No erythema or rash. Neurological:      Mental Status: She is alert and oriented to person, place, and time. Cranial Nerves: No cranial nerve deficit. Psychiatric:         Behavior: Behavior normal.         Thought Content: Thought content normal.         Judgment: Judgment normal.      Comments: Appears depressed         Labs:   Recent Labs     01/03/22  0732 01/04/22  0654   WBC 3.3* 2.9*   HGB 13.3 14.4   HCT 41.7 44.3   * 127*     Recent Labs     01/03/22  0732 01/04/22  0654    138   K 3.9 4.3    100   CO2 24 24   BUN 10 12   CREATININE 0.7 0.7   CALCIUM 8.1* 8.5     No results for input(s): AST, ALT, BILIDIR, BILITOT, ALKPHOS in the last 72 hours. No results for input(s): INR in the last 72 hours. No results for input(s): Ellene Lazier in the last 72 hours. Urinalysis:    No results found for: [place], BACTERIA, RBCUA, BLOODU, SPECGRAV, [place] [address]    Radiology:  CTA CHEST W WO CONTRAST   Final Result    No PE. Bilateral groundglass airspace infiltrates. **This report has been created using voice recognition software. It may contain minor errors which are inherent in voice recognition technology. **      Final report electronically signed by Dr. John Tinajero on 12/31/2021 7:33 PM      XR CHEST PORTABLE   Final Result   Pneumonic infiltrates are scattered throughout the lungs bilaterally.          **This report has been created using voice recognition software. It may contain minor errors which are inherent in voice recognition technology. **      Final report electronically signed by Dr Victor Hugo Pulido on 12/31/2021 4:05 PM          Diet: ADULT DIET; Regular; 4 carb choices (60 gm/meal)    DVT prophylaxis: [x] Lovenox                                 [] SCDs                                 [] SQ Heparin                                 [] Encourage ambulation           [] Already on Anticoagulation     Disposition:    [x] Home       [] TCU       [] Rehab       [] Psych       [] SNF       [] [facility]       [] Other-    Code Status: Limited x4, no intubation however patient would like medication for BiPAP or nonrebreather if needed. Electronically signed by Danielle Espana MD on 1/5/2022 at 7:16 AM    This note was electronically signed. Parts of this note may have been dictated by use of voice recognition software and electronically transcribed. The note may contain errors not detected in proofreading. Please refer to my supervising physician's documentation if my documentation differs.

## 2022-01-05 NOTE — CARE COORDINATION
Discharge Planning Update:     Hospitalist following for COVID. Remains on high flow O2, 60L and 60% FiO2, sats 95%. Code status Limited x4. Palliative Care following. IVF. Albuterol inhaler bid. Vit C. Tessalon tid, Tums prn, Codeine prn cough, Pristiq, Decadron 20mg iv daily, Lovenox, Pepcid bid, Robitussin prn, diabetes management, nausea control, pain control, Zoloft, Vit D, Zinc.      Patient Goals/Plan/Treatment Preferences: Spoke with pt's son, Ulices Manzano, on the unit. Unsure of pt's discharge plans at this time. Pt's  will be discharged home with [place] when he is discharged. Will monitor.

## 2022-01-06 NOTE — PROGRESS NOTES
PROGRESS NOTE      Patient:  Norbert Morning      Unit/Bed:6A-16/016-A    YOB: 1965    MRN: [id number]       Acct: [de-identified]     PCP: No primary care provider on file. Date of Admission: 12/31/2021    Assessment/Plan:    Anticipated Discharge in: pending course    Active Hospital Problems    Diagnosis Date Noted    Hyponatremia [E87.1]     Pneumonia due to COVID-19 virus [U07.1, J12.82] 12/31/2021     Acute hypoxic respiratory failure due to COVID-19:   Continue albuterol  Heated high flow nasal canula  Mucinex, vitamin C, Vit D and zinc  Continue incentive spirometry, Acapella  Decadron high dose 20 mg daily for 5 days followed by 10mg daily for 5 days  Tocilizumab - one time dose already given  Wean O2 as tolerated    Pneumonia due to COVID-19  See treatment above    Hypo-osmolar hyponatremia-resolved  Continue to monitor daily labs    Leukopenia   Likely secondary to COVID-19 viral infection  Possible complication due to steroid use  Trend with CBC    Thrombocytopenia - improving  Likely secondary to viral infection  Platelets: 539 on 63/57/2429  Trending    Depression  Patient on max dose of desvenlafaxine  Added sertraline    Cough  Guaifenesin-dextromethorphan for cough  Phenergan syrup q4, DC IM due to arm pain  Tessalon pearls    Nausea  Phenergan syrup q4 prn due arm pain from IM    Right arm pain  Associated with phenergan IM injection  No history of arm pain  Norco q4 PRN  Hold Tramadol and codeine      Chief Complaint: increasing shortness of breath    Hospital Course:   From HPI:  \"61year-old obese white female presents emergency department with chest pain, shortness of breath and generalized myalgia. Patient is complaining of sore throat patient has been exposed to her spouse who was positive for Covid. Patient is not immunized against Covid. Patient does not have any nausea vomiting or sweating. Does not have any diarrhea or constipation.   Patient's past medical history includes depression. \"    01/03/2022  Codeine 30mg for cough with improvement  Tramadol added for pain control    01/04/2022  Increased oxygen support required, high flow NC increased to 60 L/min with SpO2 at 93  Encourage incentive spirometry and acapella as able. 01/04/2022  Added sertraline for worsening depression  Phenergan for nausea  Switched guaifenesin to guaifenesin-dextromethorphan for cough  Codeine switched from q6 to q4 hrs for cough    01/05/2022  Patient improving    01/06/2022  Codeine and tramadol on hold, Norco for right arm pain as pain resulted in sats in low 80's maxed out on high flow nasal canula. Patient refusing BiPap or Non-rebreather. Phenergan IM switch to syrup for cough and nausea      Subjective:    Believed her breathing was improving. However her right arm pain that started after the Phenergan IM injection has become so severe that she has become tachypneic and maxed out on nasal cannula refusing BiPAP or nonrebreather mask. Patient squirming in bed holding her right arm requesting better control of arm pain. Able to take in deeper breaths overnight. Still has cough. Not using the acapell or incentive spirometry much, encouraged increasing use. Review of Systems   Constitutional: Negative for chills, fatigue and fever. HENT: Negative for ear pain, postnasal drip and trouble swallowing. Eyes: Negative for pain and visual disturbance. Respiratory: Positive for cough and shortness of breath. Cardiovascular: Negative for chest pain and palpitations. Gastrointestinal: Negative for abdominal pain, blood in stool, constipation, diarrhea, nausea and vomiting. Genitourinary: Negative for dysuria. Musculoskeletal: Positive for myalgias (rt arm pain from phenergan IM injection). Skin: Negative for rash. Neurological: Negative for headaches. Psychiatric/Behavioral:        Has a more positive outlook today.        Medications:  Reviewed    Infusion Medications    dextrose      sodium chloride 25 mL (01/02/22 1048)    sodium chloride 75 mL/hr at 01/05/22 1209     Scheduled Medications    sertraline  25 mg Oral Daily    benzonatate  100 mg Oral 3 times per day    dexamethasone  20 mg IntraVENous Daily    [START ON 1/7/2022] dexamethasone  10 mg IntraVENous Daily    albuterol sulfate HFA  2 puff Inhalation BID    desvenlafaxine succinate  100 mg Oral Daily    sodium chloride flush  5-40 mL IntraVENous 2 times per day    enoxaparin  30 mg SubCUTAneous BID    famotidine  20 mg Oral BID    insulin lispro  0-6 Units SubCUTAneous TID WC    insulin lispro  0-3 Units SubCUTAneous Nightly    Vitamin D  2,000 Units Oral Daily    ascorbic acid  1,000 mg Oral Daily    zinc sulfate  50 mg Oral Daily     PRN Meds: codeine, guaiFENesin-dextromethorphan, benzocaine-menthol, promethazine, calcium carbonate, albuterol sulfate HFA, traZODone, traMADol, glucose, dextrose, glucagon (rDNA), dextrose, sodium chloride flush, sodium chloride, ondansetron **OR** ondansetron, polyethylene glycol, acetaminophen **OR** acetaminophen, magnesium sulfate, potassium chloride      Intake/Output Summary (Last 24 hours) at 1/6/2022 0700  Last data filed at 1/5/2022 1024  Gross per 24 hour   Intake 100 ml   Output --   Net 100 ml       Diet:  ADULT DIET; Regular; 4 carb choices (60 gm/meal)    Exam:  /78   Pulse 90   Temp 97.7 °F (36.5 °C) (Oral)   Resp 24   Ht 5' 7\" (1.702 m)   Wt 192 lb 12.8 oz (87.5 kg)   SpO2 90%   BMI 30.20 kg/m²     Physical Exam  Vitals and nursing note reviewed. Constitutional:       Appearance: She is well-developed. She is ill-appearing. She is not diaphoretic. Comments: Patient appears much happier and less ill. Patient still short of breath however was able to carry a full conversation today and answer questions in full sentences. Patient smiling during exam and appeared to be much happier. HENT:      Head: Normocephalic and atraumatic. Right Ear: External ear normal.      Left Ear: External ear normal.      Nose: Nose normal.   Eyes:      General: No scleral icterus. Right eye: No discharge. Left eye: No discharge. Conjunctiva/sclera: Conjunctivae normal.   Cardiovascular:      Rate and Rhythm: Normal rate and regular rhythm. Heart sounds: Normal heart sounds. No murmur heard. Pulmonary:      Breath sounds: Normal breath sounds. Comments: Improved breath sounds, previously diminished. Ability to take deeper inspirations improved on exam.  Cough still present  Abdominal:      General: Bowel sounds are normal. There is no distension. Palpations: Abdomen is soft. Tenderness: There is no abdominal tenderness. Musculoskeletal:         General: Tenderness (right shoulder pain) present. Cervical back: Normal range of motion. Skin:     General: Skin is warm and dry. Findings: No erythema or rash. Neurological:      Mental Status: She is alert and oriented to person, place, and time. Cranial Nerves: No cranial nerve deficit. Psychiatric:         Behavior: Behavior normal.         Thought Content: Thought content normal.         Judgment: Judgment normal.      Comments: Appears depressed         Labs:   Recent Labs     01/03/22  0732 01/04/22  0654 01/05/22  0732   WBC 3.3* 2.9* 5.4   HGB 13.3 14.4 13.7   HCT 41.7 44.3 42.3   * 127* 151     Recent Labs     01/03/22  0732 01/04/22  0654 01/05/22  0732    138 137   K 3.9 4.3 4.1    100 101   CO2 24 24 23   BUN 10 12 14   CREATININE 0.7 0.7 0.7   CALCIUM 8.1* 8.5 8.5     No results for input(s): AST, ALT, BILIDIR, BILITOT, ALKPHOS in the last 72 hours. No results for input(s): INR in the last 72 hours. No results for input(s): Burnice [place] in the last 72 hours.     Urinalysis:    No results found for: Cherelle Beau, BACTERIA, RBCUA, Gaylia Pea, [address] [place] [address]    Radiology:  CTA CHEST W WO CONTRAST   Final Result    No PE. Bilateral groundglass airspace infiltrates. **This report has been created using voice recognition software. It may contain minor errors which are inherent in voice recognition technology. **      Final report electronically signed by Dr. John Tinajero on 12/31/2021 7:33 PM      XR CHEST PORTABLE   Final Result   Pneumonic infiltrates are scattered throughout the lungs bilaterally. **This report has been created using voice recognition software. It may contain minor errors which are inherent in voice recognition technology. **      Final report electronically signed by Dr Nathaniel De Luna on 12/31/2021 4:05 PM          Diet: ADULT DIET; Regular; 4 carb choices (60 gm/meal)    DVT prophylaxis: [x] Lovenox                                 [] SCDs                                 [] SQ Heparin                                 [] Encourage ambulation           [] Already on Anticoagulation     Disposition:    [x] Home       [] TCU       [] Rehab       [] Psych       [] SNF       [] [facility]       [] Other-    Code Status: Limited x4, no intubation however if BiPAP or nonrebreather needed patient requesting medication due to PTSD of face coverings. Electronically signed by Silas Enrique MD on 1/6/2022 at 7:00 AM    This note was electronically signed. Parts of this note may have been dictated by use of voice recognition software and electronically transcribed. The note may contain errors not detected in proofreading. Please refer to my supervising physician's documentation if my documentation differs.

## 2022-01-06 NOTE — PROGRESS NOTES
Pt continues to have complaints of 10/10 pain. Breathing has slightly improved, respirations 26, SpO2 95 on 100% 60L hi flow cannula. Pt and son requests phenergan syrup and that MD be contacted for additional pain management.

## 2022-01-06 NOTE — CARE COORDINATION
Discharge Planning Update:     Hospitalist following for COVID. Remains on high flow O2, 60L and 90% FiO2, sats 85-97%. Code status Limited x4. Palliative Care following. IVF. Albuterol inhaler bid. Vit C. Tessalon tid, Tums prn, Codeine prn cough, Pristiq, Decadron 20mg iv daily, Lovenox, Pepcid bid, Robitussin prn, diabetes management, nausea control, pain control, Zoloft, Vit D, Zinc.      Patient Goals/Plan/Treatment Preferences: Spoke with pt's son, Yuri Gaona, on the unit. Unsure of pt's discharge plans at this time. Pt's  will be discharged home with [facility] when he is discharged. Will monitor.

## 2022-01-07 NOTE — PROGRESS NOTES
RN called patient's son Jaye Crain to update on patient care. All questions asked were answered. He appreciated the update.

## 2022-01-07 NOTE — PROGRESS NOTES
PROGRESS NOTE      Patient:  Angie Maldonado      Unit/Bed:6A-16/016-A    YOB: 1965    MRN: [id number]       Acct: [de-identified]     PCP: No primary care provider on file. Date of Admission: 12/31/2021    Assessment/Plan:    Anticipated Discharge in: pending course    Active Hospital Problems    Diagnosis Date Noted    Acute pain of right shoulder [M25.511]     Hyponatremia [E87.1]     [facility] [U07.1, J96.00] 12/31/2021     Acute hypoxic respiratory failure due to COVID-19:   Continue albuterol  Heated high flow nasal canula  Mucinex, vitamin C, Vit D and zinc  Continue incentive spirometry, Acapella  Decadron high dose 20 mg daily for 5 days followed by 10mg daily for 5 days  Tocilizumab - one time dose already given  Wean O2 as tolerated  Morphine for arm pain and air hunger    Pneumonia due to COVID-19  See treatment above    Hypo-osmolar hyponatremia-resolved  Continue to monitor daily labs    Leukopenia   Likely secondary to COVID-19 viral infection  Possible complication due to steroid use  Trend with CBC    Thrombocytopenia - improved  Likely secondary to viral infection  Platelets: 682 on 28/56/9560  Trending    Depression - improved  Patient on max dose of desvenlafaxine  Added sertraline    Cough - stable  Guaifenesin-dextromethorphan for cough  Phenergan syrup q4, DC IM due to arm pain  Tessalon pearls    Nausea  Phenergan syrup q4 prn due arm pain from IM    Right arm pain - improving  Associated with phenergan IM injection  No history of arm pain  Lidocaine patch  Morphine  Hold Tramadol and codeine    Chief Complaint: increasing shortness of breath    Hospital Course:   From HPI:  \"61year-old obese white female presents emergency department with chest pain, shortness of breath and generalized myalgia. Patient is complaining of sore throat patient has been exposed to her spouse who was positive for Covid.   Patient is not immunized against Covid. Patient does not have any nausea vomiting or sweating. Does not have any diarrhea or constipation. Patient's past medical history includes depression. \"    01/03/2022  Codeine 30mg for cough with improvement  Tramadol added for pain control    01/04/2022  Increased oxygen support required, high flow NC increased to 60 L/min with SpO2 at 93  Encourage incentive spirometry and acapella as able. 01/04/2022  Added sertraline for worsening depression  Phenergan for nausea  Switched guaifenesin to guaifenesin-dextromethorphan for cough  Codeine switched from q6 to q4 hrs for cough    01/05/2022  Patient improving    01/06/2022  Codeine and tramadol on hold, Norco for right arm pain as pain resulted in sats in low 80's maxed out on high flow nasal canula. Patient refusing BiPap or Non-rebreather. Phenergan IM switch to syrup for cough and nausea    1/07/2022  Patient on maximum high flow nasal cannula. Requesting anxiolytic if BiPAP is needed. Otherwise patient still limited x4 with no intubation. Subjective:  Patient seen and examined bedside. Patient states she is feeling better despite being maxed out on high flow nasal cannula. She is able to take in deeper inspirations and feels less short of breath. Patient states her pain is now controlled with lidocaine patch on right arm and morphine. Had discussion with patient being on maximum high flow nasal cannula about BiPAP or nonrebreather. Patient still stating she does not want intubation and will only consider BiPAP for nonrebreather if able to receive anxiolytic. Patient states if anxiolytic does not work she just wants to be made comfortable. Review of Systems   Constitutional: Negative for chills, fatigue and fever. HENT: Negative for ear pain, postnasal drip and trouble swallowing. Eyes: Negative for pain and visual disturbance. Respiratory: Positive for cough and shortness of breath.     Cardiovascular: Negative for chest pain and palpitations. Gastrointestinal: Negative for abdominal pain, blood in stool, constipation, diarrhea, nausea and vomiting. Genitourinary: Negative for dysuria. Musculoskeletal: Positive for myalgias (rt arm pain from phenergan IM injection). Skin: Negative for rash. Neurological: Negative for headaches. Psychiatric/Behavioral:        Has a more positive outlook today. Medications:  Reviewed    Infusion Medications    dextrose      sodium chloride 25 mL (01/02/22 1048)    sodium chloride 75 mL/hr at 01/07/22 0344     Scheduled Medications    lidocaine  2 patch TransDERmal Daily    sertraline  25 mg Oral Daily    benzonatate  100 mg Oral 3 times per day    dexamethasone  10 mg IntraVENous Daily    albuterol sulfate HFA  2 puff Inhalation BID    desvenlafaxine succinate  100 mg Oral Daily    sodium chloride flush  5-40 mL IntraVENous 2 times per day    enoxaparin  30 mg SubCUTAneous BID    famotidine  20 mg Oral BID    insulin lispro  0-6 Units SubCUTAneous TID WC    insulin lispro  0-3 Units SubCUTAneous Nightly    Vitamin D  2,000 Units Oral Daily    ascorbic acid  1,000 mg Oral Daily    zinc sulfate  50 mg Oral Daily     PRN Meds: promethazine, morphine, [Held by provider] codeine, guaiFENesin-dextromethorphan, benzocaine-menthol, calcium carbonate, albuterol sulfate HFA, traZODone, [Held by provider] traMADol, glucose, dextrose, glucagon (rDNA), dextrose, sodium chloride flush, sodium chloride, ondansetron **OR** ondansetron, polyethylene glycol, acetaminophen **OR** acetaminophen, magnesium sulfate, potassium chloride      Intake/Output Summary (Last 24 hours) at 1/7/2022 0723  Last data filed at 1/7/2022 0336  Gross per 24 hour   Intake 3290.9 ml   Output --   Net 3290.9 ml       Diet:  ADULT DIET;  Regular; 4 carb choices (60 gm/meal)    Exam:  /71   Pulse 56   Temp 97.8 °F (36.6 °C) (Oral)   Resp 22   Ht 5' 7\" (1.702 m)   Wt 179 lb (81.2 kg)   SpO2 91%   BMI 28.04 kg/m²     Physical Exam  Vitals and nursing note reviewed. Constitutional:       Appearance: She is well-developed. She is ill-appearing. She is not diaphoretic. Comments: Patient appears much happier and less ill. Patient still short of breath however was able to carry a full conversation today and answer questions in full sentences. Patient smiling during exam and appeared to be much happier. HENT:      Head: Normocephalic and atraumatic. Right Ear: External ear normal.      Left Ear: External ear normal.      Nose: Nose normal.   Eyes:      General: No scleral icterus. Right eye: No discharge. Left eye: No discharge. Conjunctiva/sclera: Conjunctivae normal.   Cardiovascular:      Rate and Rhythm: Normal rate and regular rhythm. Heart sounds: Normal heart sounds. No murmur heard. Pulmonary:      Breath sounds: Normal breath sounds. Comments: Improved breath sounds, previously diminished. Ability to take deeper inspirations improved on exam.  Cough still present  Abdominal:      General: Bowel sounds are normal. There is no distension. Palpations: Abdomen is soft. Tenderness: There is no abdominal tenderness. Musculoskeletal:         General: Tenderness (right shoulder pain) present. Cervical back: Normal range of motion. Skin:     General: Skin is warm and dry. Findings: No erythema or rash. Neurological:      Mental Status: She is alert and oriented to person, place, and time. Cranial Nerves: No cranial nerve deficit. Psychiatric:         Behavior: Behavior normal.         Thought Content:  Thought content normal.         Judgment: Judgment normal.      Comments: Appears depressed         Labs:   Recent Labs     01/05/22  0732 01/06/22  0718   WBC 5.4 4.3*   HGB 13.7 13.2   HCT 42.3 41.2    134     Recent Labs     01/05/22  0732 01/06/22  0718    140   K 4.1 3.6    102   CO2 23 26   BUN 14 14   CREATININE 0.7 0. 8   CALCIUM 8.5 8.2*     No results for input(s): AST, ALT, BILIDIR, BILITOT, ALKPHOS in the last 72 hours. No results for input(s): INR in the last 72 hours. No results for input(s): [employer] in the last 72 hours. Urinalysis:    No results found for: Dorla Benes, BACTERIA, RBCUA, BLOODU, SPECGRAV, [place] [address]    Radiology:  CTA CHEST W WO CONTRAST   Final Result    No PE. Bilateral groundglass airspace infiltrates. **This report has been created using voice recognition software. It may contain minor errors which are inherent in voice recognition technology. **      Final report electronically signed by Dr. Zack Tristan on 12/31/2021 7:33 PM      XR CHEST PORTABLE   Final Result   Pneumonic infiltrates are scattered throughout the lungs bilaterally. **This report has been created using voice recognition software. It may contain minor errors which are inherent in voice recognition technology. **      Final report electronically signed by Dr Rashard Branham on 12/31/2021 4:05 PM          Diet: ADULT DIET; Regular; 4 carb choices (60 gm/meal)    DVT prophylaxis: [x] Lovenox                                 [] SCDs                                 [] SQ Heparin                                 [] Encourage ambulation           [] Already on Anticoagulation     Disposition:    [x] Home       [] TCU       [] Rehab       [] Psych       [] SNF       [] [facility]       [] Other-    Code Status: Limited x4, no intubation however if BiPAP or nonrebreather needed patient requesting medication due to PTSD of face coverings. Electronically signed by Joseph Blackmon MD on 1/7/2022 at 7:23 AM    This note was electronically signed. Parts of this note may have been dictated by use of voice recognition software and electronically transcribed. The note may contain errors not detected in proofreading.  Please refer to my supervising physician's documentation if my documentation differs.

## 2022-01-07 NOTE — PROGRESS NOTES
Case discussed with Dr Veronica Arzola. Per his report, he spoke with pt regarding code status limitations of no intubation for resp failure and no resuscitation for cardiac arrest.   Per report, pt told Dr Veronica Arzola she would be willing to try PAP mask therapy only if she is given anxiolytics but pt said that she is not to be intubated for resp failure but to allow natural death, if she fails non invasive oxygen support measures. Dr Veronica Arzola asks if [address] will discuss this with pt to assure she has full understanding of outcome of death if she needs intubation but it is not done due to her request for the limitation. Writer in to see pt. Pt resting quietly in bed, awake and alert, oriented to all spheres. Pt states that she is actually feeling \"pretty good\". Pt states no pain, no shortness of breath. Palliative care introduced, pt condition reviewed, including that she is maxed out on HFNC. Writer explained NIV with BiPAP mask, pt states, \" I told the doctor I'll try it only if he gives me medicine, other wise I can't tolerate it. \"  Writer discussed what pt wants if she can't tolerated BiPAP, next step would be placed on a vent. Writer explained that when on a vent, she would be given very adequate sedation medicine and wouldn't have any recall of being on the vent. Pt states, \" or I'll die on a vent\". Writer states that is a possibility but at least she would have been given the best chance to live. Writer explained that if she limits intubation and it's needed, her care would transitions to comfort only with her HFNC support withdrawn, symptoms managed and allow natural death. Pt states that she accepts that outcome. Pt confirms to this nurse that she does not want intubation and MIV for resp failure but to allow natural death, pt also states no resuscitation for cardiac arrest and to also allow natural death. Respect for request expressed. Pt states no further questions or needs.   Emotional support and encouragement given. Pt's nurse updated on above. Secure text sent to Dr Maicol Cantu to update him. Palliative care will continue to follow as needed, floor to notify PRN for urgent needs.

## 2022-01-07 NOTE — CARE COORDINATION
Discharge Planning Update:     Hospitalist following for COVID. Remains on high flow O2, 60L and 100% FiO2, sats 96%. Code status Limited x4. Palliative Care following. IVF. Albuterol inhaler bid. Vit C. Tessalon tid, Tums prn, Pristiq, Decadron 10mg iv daily, Lovenox, Pepcid bid, Robitussin prn, diabetes management, nausea control, pain control, Zoloft, Desyrel, Vit D, Zinc.      Patient Goals/Plan/Treatment Preferences: Spoke with pt's son, Valerie Ramos, on the unit. Unsure of pt's discharge plans at this time. Pt's  will now be discharged to [facility] when he is discharged.  Will monitor.

## 2022-01-07 NOTE — ACP (ADVANCE CARE PLANNING)
Advance Care Planning     Advance Care Planning Activator (Inpatient)  Conversation Note      Date of ACP Conversation: 1/7/2022     Conversation Conducted with: Patient with Decision Making Capacity    ACP Activator: Louie Hebert:     Current Designated Health Care Decision Maker: Today we documented Decision Maker(s) consistent with Legal Next of Kin hierarchy. Care Preferences    Ventilation: \"If you were in your present state of health and suddenly became very ill and were unable to breathe on your own, what would your preference be about the use of a ventilator (breathing machine) if it were available to you? \"      Would the patient desire the use of ventilator (breathing machine)?: no    \"If your health worsens and it becomes clear that your chance of recovery is unlikely, what would your preference be about the use of a ventilator (breathing machine) if it were available to you? \"     Would the patient desire the use of ventilator (breathing machine)?: No      Resuscitation  \"CPR works best to restart the heart when there is a sudden event, like a heart attack, in someone who is otherwise healthy. Unfortunately, CPR does not typically restart the heart for people who have serious health conditions or who are very sick. \"    \"In the event your heart stopped as a result of an underlying serious health condition, would you want attempts to be made to restart your heart (answer \"yes\" for attempt to resuscitate) or would you prefer a natural death (answer \"no\" for do not attempt to resuscitate)? \" no       [] Yes   [x] No   Educated Patient / Ibeth Harding regarding differences between Advance Directives and portable DNR orders.     Length of ACP Conversation in minutes:  39  Conversation Outcomes:  [x] ACP discussion completed  [] Existing advance directive reviewed with patient; no changes to patient's previously recorded wishes  [] New Advance Directive completed  [] Portable Do Not Rescitate prepared for Provider review and signature  [] POLST/POST/MOLST/MOST prepared for Provider review and signature      Follow-up plan:    [] Schedule follow-up conversation to continue planning  [] Referred individual to Provider for additional questions/concerns   [] Advised patient/agent/surrogate to review completed ACP document and update if needed with changes in condition, patient preferences or care setting    [] This note routed to one or more involved healthcare providers      - Met with Alec Sharif in her room to discuss her ACP decisions of care. Alec Sharif is currently listed as a Limited x4.   - She continued to express her desire to not ever use a vent. She also explained that she is not able to mask so she knows there is limited resources after the high-flow O2.  - Alec Sharif spoke of \"bad news\" she got about her  today. Offered support however she was reluctant to discuss and became tearful. She spoke highly of her son Brock Roque who is taking care of everything for her to keep their home running while they are in the hospital. She asked that Brock Roque be her first emergency contact so this staff updated the record for her. Carmen Escalante shared that she has spoke to her physician about her care plan and she wants to remain a Limited x4 Code Status. Completing a portable DNR was not discussed during this encounter.  Continued support will be provided by the ACP team

## 2022-01-08 NOTE — FLOWSHEET NOTE
01/08/22 0241   Provider Notification   Reason for Communication Evaluate  (Patient agreeable to try BIPAP if medicated first)   Provider Name Delfino Marrero   Provider Notification Physician   Method of Communication Secure Message   Response See orders  (Ativan and BIPAP ordered)   Notification Time 4663

## 2022-01-08 NOTE — PROGRESS NOTES
Patients son, Padma Pak here visiting, updated on patient care. Pt switched from bipap to high flow. Pt tolerating well.

## 2022-01-08 NOTE — PROGRESS NOTES
0030: Patient oxygen dropped to 78%. RN made multiple attempts to have patient prone herself. Patient turned more on her side, but would not turn on to her stomach. RN encouraged patient to breathe in through her nose and out her mouth. Patient remains at 78%. This RN asked patient about wearing a BIPAP. Patient refused BIPAP and stated, \"Im not ready to discuss that right now. \"   2053: Patient now at 95%

## 2022-01-08 NOTE — FLOWSHEET NOTE
01/08/22 0241   Provider Notification   Reason for Communication Evaluate;Parent request  (Patient agreeable to try BIPAP if medicated first)   Provider Name Tiarra Xanderbrie   Provider Notification Physician   Method of Communication Secure Message   Response See orders  (Ativan and BIPAP ordered)   Notification Time 2218

## 2022-01-08 NOTE — PROGRESS NOTES
throat patient has been exposed to her spouse who was positive for Covid. Patient is not immunized against Covid. Patient does not have any nausea vomiting or sweating. Does not have any diarrhea or constipation. Patient's past medical history includes depression. \"    01/03/2022  Codeine 30mg for cough with improvement  Tramadol added for pain control    01/04/2022  Increased oxygen support required, high flow NC increased to 60 L/min with SpO2 at 93  Encourage incentive spirometry and acapella as able. 01/04/2022  Added sertraline for worsening depression  Phenergan for nausea  Switched guaifenesin to guaifenesin-dextromethorphan for cough  Codeine switched from q6 to q4 hrs for cough    01/05/2022  Patient improving    01/06/2022  Codeine and tramadol on hold, Norco for right arm pain as pain resulted in sats in low 80's maxed out on high flow nasal canula. Patient refusing BiPap or Non-rebreather. Phenergan IM switch to syrup for cough and nausea    1/07/2022  Patient on maximum high flow nasal cannula. Requesting anxiolytic if BiPAP is needed. Otherwise patient still limited x4 with no intubation. 1/8/2022  Requireing bipap starting overnight, on lorazepam for anxiety    Subjective:  Patient on Bipap, not wanting to talk much due to anxiety with the mask. Has anxiety/PTSD to face coverings due to childhood trauma. Patient agreed to hand signs for questions. Gave a thumbs up when asked if she was doing ok. Gave a thumbs down when asked if she had questions, concerns or complaints. Patient denies chest pain, fever, chills, abdominal pain. No current complaints of right arm pain. Appeared comfortable on Bipap. Review of Systems   Constitutional: Negative for chills, fatigue and fever. HENT: Negative for ear pain, postnasal drip and trouble swallowing. Eyes: Negative for pain and visual disturbance. Respiratory: Positive for cough and shortness of breath.     Cardiovascular: Negative for chest pain and palpitations. Gastrointestinal: Negative for abdominal pain, blood in stool, constipation, diarrhea, nausea and vomiting. Genitourinary: Negative for dysuria. Musculoskeletal: Positive for myalgias (Improving rt arm pain). Skin: Negative for rash. Neurological: Negative for headaches. Psychiatric/Behavioral:        Has a more positive outlook today. Medications:  Reviewed    Infusion Medications    dextrose      sodium chloride 25 mL (01/02/22 1048)    sodium chloride 75 mL/hr at 01/08/22 0521     Scheduled Medications    lidocaine  2 patch TransDERmal Daily    sertraline  25 mg Oral Daily    benzonatate  100 mg Oral 3 times per day    dexamethasone  10 mg IntraVENous Daily    albuterol sulfate HFA  2 puff Inhalation BID    desvenlafaxine succinate  100 mg Oral Daily    sodium chloride flush  5-40 mL IntraVENous 2 times per day    enoxaparin  30 mg SubCUTAneous BID    famotidine  20 mg Oral BID    insulin lispro  0-6 Units SubCUTAneous TID WC    insulin lispro  0-3 Units SubCUTAneous Nightly    Vitamin D  2,000 Units Oral Daily    ascorbic acid  1,000 mg Oral Daily    zinc sulfate  50 mg Oral Daily     PRN Meds: LORazepam, promethazine, morphine, [Held by provider] codeine, guaiFENesin-dextromethorphan, benzocaine-menthol, calcium carbonate, albuterol sulfate HFA, traZODone, [Held by provider] traMADol, glucose, dextrose, glucagon (rDNA), dextrose, sodium chloride flush, sodium chloride, ondansetron **OR** ondansetron, polyethylene glycol, acetaminophen **OR** acetaminophen, magnesium sulfate, potassium chloride    No intake or output data in the 24 hours ending 01/08/22 0715    Diet:  ADULT DIET; Regular; 4 carb choices (60 gm/meal)    Exam:  /63   Pulse 64   Temp 98.3 °F (36.8 °C) (Oral)   Resp 26   Ht 5' 7\" (1.702 m)   Wt 179 lb (81.2 kg)   SpO2 94%   BMI 28.04 kg/m²     Physical Exam  Vitals and nursing note reviewed.    Constitutional: Appearance: She is well-developed. She is ill-appearing. She is not diaphoretic. Comments: Appears ill on BiPAP. HENT:      Head: Normocephalic and atraumatic. Right Ear: External ear normal.      Left Ear: External ear normal.      Nose: Nose normal.   Eyes:      General: No scleral icterus. Right eye: No discharge. Left eye: No discharge. Conjunctiva/sclera: Conjunctivae normal.   Cardiovascular:      Rate and Rhythm: Normal rate and regular rhythm. Heart sounds: Normal heart sounds. No murmur heard. Pulmonary:      Breath sounds: Normal breath sounds. Comments: On BiPAP  Lungs appear clear to auscultation  Abdominal:      General: Bowel sounds are normal. There is no distension. Palpations: Abdomen is soft. Tenderness: There is no abdominal tenderness. Musculoskeletal:         General: Tenderness (right shoulder pain) present. Cervical back: Normal range of motion. Skin:     General: Skin is warm and dry. Findings: No erythema or rash. Neurological:      Mental Status: She is alert and oriented to person, place, and time. Cranial Nerves: No cranial nerve deficit. Psychiatric:         Behavior: Behavior normal.         Thought Content: Thought content normal.         Judgment: Judgment normal.      Comments: Appears to have low mood. Anxiety currently appears to be controlled with medication. Labs:   Recent Labs     01/05/22  0732 01/06/22  0718 01/07/22  0711   WBC 5.4 4.3* 3.9*   HGB 13.7 13.2 12.7   HCT 42.3 41.2 39.0    134 142     Recent Labs     01/05/22  0732 01/06/22  0718 01/07/22  0711    140 139   K 4.1 3.6 4.0    102 104   CO2 23 26 23   BUN 14 14 12   CREATININE 0.7 0.8 0.6   CALCIUM 8.5 8.2* 8.2*     No results for input(s): AST, ALT, BILIDIR, BILITOT, ALKPHOS in the last 72 hours. No results for input(s): INR in the last 72 hours.   Recent Labs     01/07/22  0711   CKTOTAL 898* Urinalysis:    No results found for: Vilinda Fought, BACTERIA, RBCUA, BLOODU, SPECGRAV, [place] [address]    Radiology:  CTA CHEST W WO CONTRAST   Final Result    No PE. Bilateral groundglass airspace infiltrates. **This report has been created using voice recognition software. It may contain minor errors which are inherent in voice recognition technology. **      Final report electronically signed by Dr. Barby Hancock on 12/31/2021 7:33 PM      XR CHEST PORTABLE   Final Result   Pneumonic infiltrates are scattered throughout the lungs bilaterally. **This report has been created using voice recognition software. It may contain minor errors which are inherent in voice recognition technology. **      Final report electronically signed by Dr Serena Savage on 12/31/2021 4:05 PM          Diet: ADULT DIET; Regular; 4 carb choices (60 gm/meal)    DVT prophylaxis: [x] Lovenox                                 [] SCDs                                 [] SQ Heparin                                 [] Encourage ambulation           [] Already on Anticoagulation     Disposition:    [x] Home       [] TCU       [] Rehab       [] Psych       [] SNF       [] [facility]       [] Other-    Code Status: Limited x4, no intubation however if BiPAP or nonrebreather needed patient requesting medication due to PTSD of face coverings. Electronically signed by Amee Slater MD on 1/8/2022 at 7:15 AM    This note was electronically signed. Parts of this note may have been dictated by use of voice recognition software and electronically transcribed. The note may contain errors not detected in proofreading. Please refer to my supervising physician's documentation if my documentation differs.

## 2022-01-09 NOTE — PROGRESS NOTES
Patient son to floor at this time. Updates on patient status given. All questions answered at this time.

## 2022-01-09 NOTE — PROGRESS NOTES
Patients son Woodruff Anton called with update. All questions and concerns answered at this time.     Electronically signed by Aris Stokes RN on 1/9/2022 at 6:11 AM

## 2022-01-09 NOTE — PROGRESS NOTES
Post-fall pharmacy consult    Pharmacy has been consulted to review medication profile for fall risk. Medications increasing risk of falling: lorazepam (recent dose and new), morphine  Medications increasing risk of bleeding: enoxaparin  Findings discussed with Rosemarie Ledbetter RN  Recommendations: none - patient has already been to CT scan. Would recommend decreasing lorazepam dose if falls continue.     Glory Mojica [facility], BCPS, BCGP  1/9/2022     3:24 AM

## 2022-01-09 NOTE — PROGRESS NOTES
PROGRESS NOTE      Patient:  Mary May      Unit/Bed:6A-16/016-A    YOB: 1965    MRN: [id number]       Acct: [de-identified]     PCP: No primary care provider on file. Date of Admission: 12/31/2021    Assessment/Plan:    Anticipated Discharge in: pending course    Active Hospital Problems    Diagnosis Date Noted    Acute pain of right shoulder [M25.511]     Hyponatremia [E87.1]     Acute respiratory failure due to COVID-19 ([facility] [address] [U07.1, J96.00] 12/31/2021     Acute hypoxic respiratory failure due to COVID-19  Patient unable to tolerate high flow nasal cannula this morning. On BiPAP 20/14 100% FiO2.   Continue albuterol  Treatment increased to Bipap due to hypoxia, added lorazepam for anxiety of face coverings  Mucinex, vitamin C, Vit D and zinc  Continue incentive spirometry, Acapella when off of Bipap  Decadron high dose 20 mg daily for 5 days followed by 10mg daily for 5 days  Tocilizumab - one time dose already given  Wean O2 as tolerated  Morphine for arm pain and air hunger    Pneumonia due to COVID-19  See treatment above    Hyponatremia-  Sodium 132 this morning  Continue to monitor daily labs    Leukopenia   Likely secondary to COVID-19 viral infection  Possible complication due to steroid use  Trend with CBC    Thrombocytopenia - improved  Likely secondary to viral infection  Platelets: 384 on 43/18/7626  Trending    Depression - improved  Patient on max dose of desvenlafaxine  Continue sertraline    Cough - stable  Guaifenesin-dextromethorphan for cough  Phenergan syrup q4, DC IM due to arm pain  Tessalon pearls    Nausea  Phenergan syrup q4 prn due arm pain from IM    Right arm pain - improving  Associated with phenergan IM injection  No history of arm pain  Elevated CK after phenergan IM injection 898  Lidocaine patch  Morphine  Hold Tramadol and codeine    Anxiety/posttraumatic stress disorder  -Continue Ativan as needed for anxiety    CODE STATUS: Limited x4    Chief Complaint: increasing shortness of breath    Hospital Course:   From HPI:  \"61year-old obese white female presents emergency department with chest pain, shortness of breath and generalized myalgia. Patient is complaining of sore throat patient has been exposed to her spouse who was positive for Covid. Patient is not immunized against Covid. Patient does not have any nausea vomiting or sweating. Does not have any diarrhea or constipation. Patient's past medical history includes depression. \"    01/03/2022  Codeine 30mg for cough with improvement  Tramadol added for pain control    01/04/2022  Increased oxygen support required, high flow NC increased to 60 L/min with SpO2 at 93  Encourage incentive spirometry and acapella as able. 01/04/2022  Added sertraline for worsening depression  Phenergan for nausea  Switched guaifenesin to guaifenesin-dextromethorphan for cough  Codeine switched from q6 to q4 hrs for cough    01/05/2022  Patient improving    01/06/2022  Codeine and tramadol on hold, Norco for right arm pain as pain resulted in sats in low 80's maxed out on high flow nasal canula. Patient refusing BiPap or Non-rebreather. Phenergan IM switch to syrup for cough and nausea    1/07/2022  Patient on maximum high flow nasal cannula. Requesting anxiolytic if BiPAP is needed. Otherwise patient still limited x4 with no intubation. 1/8/2022  Requireing bipap starting overnight, on lorazepam for anxiety    Subjective:  Patient was seen and examined in room this morning. Patient had a fall last night. Head CT was negative for any intracranial abnormalities. Patient was unable to be weaned from BiPAP. She desatted when the nurse tried to put her on high flow nasal cannula. Patient reports her pain is well controlled on her current medications. Reports that her breathing is about the same as yesterday. Nursing staff reports that she patient is not eating much food.     Review of Systems   Constitutional: Negative for chills, fatigue and fever. HENT: Negative for ear pain, postnasal drip and trouble swallowing. Eyes: Negative for pain and visual disturbance. Respiratory: Positive for cough and shortness of breath. Cardiovascular: Negative for chest pain and palpitations. Gastrointestinal: Negative for abdominal pain, blood in stool, constipation, diarrhea, nausea and vomiting. Genitourinary: Negative for dysuria. Musculoskeletal: Positive for myalgias (Improving rt arm pain). Skin: Negative for rash. Neurological: Negative for headaches.        Medications:  Reviewed    Infusion Medications    dextrose      sodium chloride 25 mL (01/02/22 1048)    sodium chloride 75 mL/hr at 01/08/22 0521     Scheduled Medications    lidocaine  2 patch TransDERmal Daily    sertraline  25 mg Oral Daily    benzonatate  100 mg Oral 3 times per day    dexamethasone  10 mg IntraVENous Daily    albuterol sulfate HFA  2 puff Inhalation BID    desvenlafaxine succinate  100 mg Oral Daily    sodium chloride flush  5-40 mL IntraVENous 2 times per day    enoxaparin  30 mg SubCUTAneous BID    famotidine  20 mg Oral BID    insulin lispro  0-6 Units SubCUTAneous TID WC    insulin lispro  0-3 Units SubCUTAneous Nightly    Vitamin D  2,000 Units Oral Daily    ascorbic acid  1,000 mg Oral Daily    zinc sulfate  50 mg Oral Daily     PRN Meds: LORazepam, promethazine, morphine, [Held by provider] codeine, guaiFENesin-dextromethorphan, benzocaine-menthol, calcium carbonate, albuterol sulfate HFA, traZODone, [Held by provider] traMADol, glucose, dextrose, glucagon (rDNA), dextrose, sodium chloride flush, sodium chloride, ondansetron **OR** ondansetron, polyethylene glycol, acetaminophen **OR** acetaminophen, magnesium sulfate, potassium chloride      Intake/Output Summary (Last 24 hours) at 1/9/2022 0717  Last data filed at 1/9/2022 0117  Gross per 24 hour   Intake 180 ml   Output --   Net 180 ml       Diet:  ADULT DIET; Regular; 4 carb choices (60 gm/meal)    Exam:  /61   Pulse 79   Temp 98.5 °F (36.9 °C) (Oral)   Resp 22   Ht 5' 7\" (1.702 m)   Wt 179 lb (81.2 kg)   SpO2 94%   BMI 28.04 kg/m²     Physical Exam  Vitals and nursing note reviewed. Constitutional:       General: She is awake. She is not in acute distress. Appearance: She is well-developed. She is ill-appearing. She is not diaphoretic. Interventions: Face mask in place. Comments: Appears ill on BiPAP. HENT:      Head: Normocephalic and atraumatic. Right Ear: External ear normal.      Left Ear: External ear normal.      Nose: Nose normal.      Mouth/Throat:      Mouth: Mucous membranes are moist.   Eyes:      General: No scleral icterus. Right eye: No discharge. Left eye: No discharge. Conjunctiva/sclera: Conjunctivae normal.   Cardiovascular:      Rate and Rhythm: Normal rate and regular rhythm. Heart sounds: Normal heart sounds. No murmur heard. Pulmonary:      Effort: No tachypnea or respiratory distress. Breath sounds: Normal breath sounds. No wheezing, rhonchi or rales. Comments: On BiPAP  Lungs clear to auscultation  Abdominal:      General: Bowel sounds are normal. There is no distension. Palpations: Abdomen is soft. Tenderness: There is no abdominal tenderness. Musculoskeletal:         General: Tenderness (right shoulder pain) present. Cervical back: Normal range of motion. Right lower leg: No edema. Left lower leg: No edema. Skin:     General: Skin is warm and dry. Findings: No erythema or rash. Neurological:      Mental Status: She is alert and oriented to person, place, and time. Cranial Nerves: No cranial nerve deficit. Psychiatric:         Mood and Affect: Affect is flat. Speech: Speech is delayed (BiPap). Behavior: Behavior normal. Behavior is cooperative. Thought Content:  Thought content normal.         Judgment: Judgment normal.         Labs:   Recent Labs     01/06/22  0718 01/07/22  0711 01/08/22 0717   WBC 4.3* 3.9* 4.2*   HGB 13.2 12.7 12.7   HCT 41.2 39.0 39.5    142 142     Recent Labs     01/06/22  0718 01/07/22  0711 01/08/22 0717    139 137   K 3.6 4.0 3.9    104 101   CO2 26 23 29   BUN 14 12 12   CREATININE 0.8 0.6 0.7   CALCIUM 8.2* 8.2* 7.9*     No results for input(s): AST, ALT, BILIDIR, BILITOT, ALKPHOS in the last 72 hours. No results for input(s): INR in the last 72 hours. Recent Labs     01/07/22  0711 01/08/22 0717   CKTOTAL 898* 1,410*       Urinalysis:    No results found for: Darliss Mcburney, BACTERIA, RBCUA, BLOODU, SPECGRAV, GLUCOSEU    Radiology:  CT HEAD WO CONTRAST   Final Result   1. No acute disease in the brain. This document has been electronically signed by: Christi Garcia M.D. on    01/09/2022 02:33 AM      All CTs at this facility use dose modulation techniques and iterative    reconstructions, and/or weight-based dosing   when appropriate to reduce radiation to a low as reasonably achievable. CTA CHEST W WO CONTRAST   Final Result    No PE. Bilateral groundglass airspace infiltrates. **This report has been created using voice recognition software. It may contain minor errors which are inherent in voice recognition technology. **      Final report electronically signed by Dr. Amadeo Koenig on 12/31/2021 7:33 PM      XR CHEST PORTABLE   Final Result   Pneumonic infiltrates are scattered throughout the lungs bilaterally. **This report has been created using voice recognition software. It may contain minor errors which are inherent in voice recognition technology. **      Final report electronically signed by Dr Ella Salmeron on 12/31/2021 4:05 PM          Diet: ADULT DIET;  Regular; 4 carb choices (60 gm/meal)    DVT prophylaxis: [x] Lovenox                                 [] SCDs                                 [] SQ Heparin [] Encourage ambulation           [] Already on Anticoagulation     Disposition:    [x] Home       [] TCU       [] Rehab       [] Psych       [] SNF       [] [facility]       [] Other-    Code Status: Limited x4, no intubation however if BiPAP or nonrebreather needed patient requesting medication due to PTSD of face coverings. Electronically signed by Corrina Carlisle MD on 1/9/2022 at 7:17 AM    This note was electronically signed. Parts of this note may have been dictated by use of voice recognition software and electronically transcribed. The note may contain errors not detected in proofreading. Please refer to my supervising physician's documentation if my documentation differs.

## 2022-01-09 NOTE — PROGRESS NOTES
Post-Fall Assessment  Date of Fall:   1-9-2022  Time of Fall:   0125    Yes No N/A Comment   Was Patient on [address]? [] [] [x]     Was the Fall Witnessed? [x] [] []     Were Clothes a Factor? [] [] [x]     Was Patient Wearing Corrective Footwear? [x] [] []     Other Environmental Factors Involved? [x] [] []  Bipap       Description of Fall  Who found the patient: Zurdo Rosalva  Where was the patient at the time of the fall:  Room  Brief description of fall: 0117-Kalin got vital on patient and rounded. 0120- Patient was trying to take Bipap off, Yulissa Nguyen went in to help secure device. 0125-This Rn heard Bipap alarming, Rn gowning up, alarming still, ran in and couldn't catch patient. Patient fell against the wall. This Rn, Hina Corrales and anabell Rn got patient back to the bed safely. 0130-Vitals were obtained, stable condition. Patient comments regarding fall:   none, Rn asked about pain, none. Medications potentially contributing to fall risk (such as Sedatives, Hypnotics, Antihypertensives, Narcotics, Psychotropics, Anticonvulsants):   Ativan 1mg      Patient Assessment of Injury     (Please document Vital Signs in Doc Flowsheet)       Yes No   Patient hit his/her head [x] []   Patient is taking an anticoagulant [x] []   CT of Head requested [x] []      Neurological Assessment Protocol:     If the patient has hit his/her head during this fall,    a Neurological Assessment must be completed every 2 hours for 12 hours;   every 3 hours for 24 hours;   then every 4 hours for 24 hours. Document in Doc Flowsheets.     Neurological Assessment Protocol initiated  yes     If the patient did not hit his/her head during this fall, monitor vital signs every 8 hours.   Notify the physician within 24 hours and document.        Physician Notification  Please document under Provider Notification group within the Assessment (Complex Assessment) template of Doc Flowsheets.      Physician notified yes     Pharmacy notified yes            Time Notified: Wan Klein Supervisor/Clinical Manager Notified:   yes Golden Dumont, [place]  Date:   1/9/2022        Time:   0138  Family Member Notified:   Arminda Moreira (son)   Date:   1-9-2022        Time:   0229    This Rn will reassess and monitor patient    Electronically signed by Sabra Gilliam RN on 1/9/2022 at 3:06 AM

## 2022-01-10 NOTE — CARE COORDINATION
Discharge Planning Update:     Hospitalist following for COVID. Pt is now on BiPAP, FiO2 from %. Remains afebrile. O2 sats %. Code status Limited x4. Palliative Care following. IVF. Albuterol inhaler bid. Vit C. Tessalon tid, Tums prn, Codeine prn cough, Pristiq, Decadron 20mg iv daily, Lovenox, Pepcid bid, Robitussin prn, diabetes management, nausea control, pain control, Zoloft, Vit D, Zinc.      Patient Goals/Plan/Treatment Preferences: From home with . Pt's  was discharged to [facility] over the weekend. Unsure of discharge plans for pt.  Monitor.

## 2022-01-10 NOTE — PROGRESS NOTES
Comprehensive Nutrition Assessment    Type and Reason for Visit:  Initial (LOS)    Nutrition Recommendations/Plan:   Will send Ensure Enlive TID. Monitor po, pt. Status vs. Need for additional nutrition interventions. Nutrition Assessment:    Pt. nutritionally compromised AEB inadequate oral intake with Bipap. At risk for further nutrition compromise r/t COVID (diagnosed 12/21, s/s - 12/28), respiratory failure, pneumonia  and underlying medical condition (hx anxiety, PTSD). Nutrition recommendations/interventions as per above. Malnutrition Assessment:  Malnutrition Status:  Insufficient data    Context:  Acute Illness     Findings of the 6 clinical characteristics of malnutrition:  Energy Intake:  Unable to assess  Weight Loss:  No significant weight loss     Body Fat Loss:  Unable to assess     Muscle Mass Loss:  Unable to assess    Fluid Accumulation:  Unable to assess     Strength:  Not Performed    Estimated Daily Nutrient Needs:  Energy (kcal):  5474-9043 kcals (25-35); Weight Used for Energy Requirements:   (81 kgm)     Protein (g):   gm (1.2-2); Weight Used for Protein Requirements:   (61 kgm)             Nutrition Related Findings:    Pt. Seen - on Bipap; drowsy; difficulty answering questions; RN reports has not been able to remove Bipap for meals since last evening; hoping to try for supper; BM 1/7; Rx includes Dexamethasone, Vitamin C, D, Zinc, Glycolax; 1/10: Glucose 115, BUN 11, Cr 0.5, Potassium 3.8    Wounds:  None       Current Nutrition Therapies:    ADULT DIET;  Regular; 4 carb choices (60 gm/meal)  ADULT ORAL NUTRITION SUPPLEMENT; Breakfast, Lunch, Dinner; Standard High Calorie/High Protein Oral Supplement    Anthropometric Measures:  · Height: 5' 7\" (170.2 cm)  · Current Body Weight: 179 lb (81.2 kg) (1/7 no edema)   · Admission Body Weight: 192 lb (87.1 kg) (1/1 no edema)    · Usual Body Weight:  (per EMR: 4/15/21: 184# 9.6 oz)     · Ideal Body Weight: 135 lbs;      · BMI: 28  · BMI Categories: Overweight (BMI 25.0-29. 9)       Nutrition Diagnosis:   · Inadequate oral intake related to impaired respiratory function as evidenced by  (poor po per staff)      Nutrition Interventions:   Food and/or Nutrient Delivery:  Continue Current Diet,Start Oral Nutrition Supplement  Nutrition Education/Counseling:  Education not appropriate   Coordination of Nutrition Care:  Continue to monitor while inpatient    Goals:  Pt. will tolerate and consume 75% or more of meals during LOS. Nutrition Monitoring and Evaluation:   Behavioral-Environmental Outcomes:  None Identified   Food/Nutrient Intake Outcomes:  Diet Advancement/Tolerance,Food and Nutrient Intake,Supplement Intake  Physical Signs/Symptoms Outcomes:  Biochemical Data,Chewing or Swallowing,GI Status,Fluid Status or Edema,Nutrition Focused Physical Findings,Skin,Weight     Discharge Planning:     Too soon to determine     Electronically signed by Jeannine Clay RD, LD on 1/10/22 at 4:09 PM EST    Contact: [phone]

## 2022-01-11 NOTE — PROGRESS NOTES
PROGRESS NOTE      Patient:  Franny Walker      Unit/Bed:6A-16/016-A    YOB: 1965    MRN: [id number]       Acct: [de-identified]     PCP: No primary care provider on file. Date of Admission: 12/31/2021    Assessment/Plan:    Anticipated Discharge in: pending course    Active Hospital Problems    Diagnosis Date Noted    Acute pain of right shoulder [M25.511]     Hyponatremia [E87.1]     Pneumonia due to COVID-19 virus [U07.1, J12.82] 12/31/2021     Acute hypoxic respiratory failure due to COVID-19  Patient unable to tolerate high flow nasal cannula this morning. On BiPAP 20/14 75% FiO2.   Continue albuterol  Treatment increased to Bipap due to hypoxia, added lorazepam for anxiety of face coverings  Mucinex, vitamin C, Vit D and zinc  Continue incentive spirometry, Acapella when off of Bipap  Decadron high dose 20 mg daily for 5 days followed by 10mg daily for 5 days  Tocilizumab - one time dose already given  Wean O2 as tolerated  Morphine for arm pain and air hunger    Pneumonia due to COVID-19  See treatment above    Hyponatremia-  Resolved    Leukopenia   Likely secondary to COVID-19 viral infection  Possible complication due to steroid use  Trend with CBC    Thrombocytopenia - improved  Likely secondary to viral infection  Platelets: 161 on 39/00/7753  Trending    Depression - improved  Patient on max dose of desvenlafaxine  Continue sertraline    Cough - stable  Guaifenesin-dextromethorphan for cough  Phenergan syrup q4, DC IM due to arm pain  Tessalon pearls    Nausea  Phenergan syrup q4 prn due arm pain from IM    Right arm pain - improving  Associated with phenergan IM injection  No history of arm pain  Elevated CK after phenergan IM injection 898  Lidocaine patch  Morphine  Hold Tramadol and codeine    Anxiety/posttraumatic stress disorder  -Continue Ativan as needed for anxiety    CODE STATUS: Limited x4    Chief Complaint: increasing shortness of breath    Hospital Course:   From HPI:  \"61year-old obese white female presents emergency department with chest pain, shortness of breath and generalized myalgia. Patient is complaining of sore throat patient has been exposed to her spouse who was positive for Covid. Patient is not immunized against Covid. Patient does not have any nausea vomiting or sweating. Does not have any diarrhea or constipation. Patient's past medical history includes depression. \"    01/03/2022  Codeine 30mg for cough with improvement  Tramadol added for pain control    01/04/2022  Increased oxygen support required, high flow NC increased to 60 L/min with SpO2 at 93  Encourage incentive spirometry and acapella as able. 01/04/2022  Added sertraline for worsening depression  Phenergan for nausea  Switched guaifenesin to guaifenesin-dextromethorphan for cough  Codeine switched from q6 to q4 hrs for cough    01/05/2022  Patient improving    01/06/2022  Codeine and tramadol on hold, Norco for right arm pain as pain resulted in sats in low 80's maxed out on high flow nasal canula. Patient refusing BiPap or Non-rebreather. Phenergan IM switch to syrup for cough and nausea    1/07/2022  Patient on maximum high flow nasal cannula. Requesting anxiolytic if BiPAP is needed. Otherwise patient still limited x4 with no intubation. 1/8/2022  Requireing bipap starting overnight, on lorazepam for anxiety    Subjective:  Patient was seen and examined in room this morning. Patient had a fall  Head CT was negative for any intracranial abnormalities. Patient was unable to be weaned from BiPAP. She desatted when the nurse tried to put her on high flow nasal cannula. Patient reports her pain is well controlled on her current medications. Reports that her breathing is about the same as yesterday. Nursing staff reports that she patient is not eating much food. Review of Systems   Constitutional: Negative for chills, fatigue and fever.    HENT: Negative for ear pain, postnasal drip and trouble swallowing. Eyes: Negative for pain and visual disturbance. Respiratory: Positive for cough and shortness of breath. Cardiovascular: Negative for chest pain and palpitations. Gastrointestinal: Negative for abdominal pain, blood in stool, constipation, diarrhea, nausea and vomiting. Genitourinary: Negative for dysuria. Musculoskeletal: Positive for myalgias (Improving rt arm pain). Skin: Negative for rash. Neurological: Negative for headaches. Medications:  Reviewed    Infusion Medications    dextrose      sodium chloride 25 mL (01/02/22 1048)    sodium chloride 75 mL/hr at 01/08/22 3363     Scheduled Medications    lidocaine  2 patch TransDERmal Daily    sertraline  25 mg Oral Daily    benzonatate  100 mg Oral 3 times per day    dexamethasone  10 mg IntraVENous Daily    albuterol sulfate HFA  2 puff Inhalation BID    desvenlafaxine succinate  100 mg Oral Daily    sodium chloride flush  5-40 mL IntraVENous 2 times per day    enoxaparin  30 mg SubCUTAneous BID    famotidine  20 mg Oral BID    insulin lispro  0-6 Units SubCUTAneous TID WC    insulin lispro  0-3 Units SubCUTAneous Nightly    Vitamin D  2,000 Units Oral Daily    ascorbic acid  1,000 mg Oral Daily    zinc sulfate  50 mg Oral Daily     PRN Meds: LORazepam, promethazine, morphine, [Held by provider] codeine, guaiFENesin-dextromethorphan, benzocaine-menthol, calcium carbonate, albuterol sulfate HFA, traZODone, [Held by provider] traMADol, glucose, dextrose, glucagon (rDNA), dextrose, sodium chloride flush, sodium chloride, ondansetron **OR** ondansetron, polyethylene glycol, acetaminophen **OR** acetaminophen, magnesium sulfate, potassium chloride      Intake/Output Summary (Last 24 hours) at 1/10/2022 2002  Last data filed at 1/10/2022 1730  Gross per 24 hour   Intake --   Output 675 ml   Net -675 ml       Diet:  ADULT DIET;  Regular; 4 carb choices (60 gm/meal)  ADULT ORAL NUTRITION SUPPLEMENT; Breakfast, Lunch, Dinner; Standard High Calorie/High Protein Oral Supplement    Exam:  /64   Pulse 83   Temp 99 °F (37.2 °C) (Axillary)   Resp (!) 42   Ht 5' 7\" (1.702 m)   Wt 179 lb (81.2 kg)   SpO2 94%   BMI 28.04 kg/m²     Physical Exam  Vitals and nursing note reviewed. Constitutional:       General: She is awake. She is not in acute distress. Appearance: She is well-developed. She is ill-appearing. She is not diaphoretic. Interventions: Face mask in place. Comments: Appears ill on BiPAP. HENT:      Head: Normocephalic and atraumatic. Right Ear: External ear normal.      Left Ear: External ear normal.      Nose: Nose normal.      Mouth/Throat:      Mouth: Mucous membranes are moist.   Eyes:      General: No scleral icterus. Right eye: No discharge. Left eye: No discharge. Conjunctiva/sclera: Conjunctivae normal.   Cardiovascular:      Rate and Rhythm: Normal rate and regular rhythm. Heart sounds: Normal heart sounds. No murmur heard. Pulmonary:      Effort: No tachypnea or respiratory distress. Breath sounds: Normal breath sounds. No wheezing, rhonchi or rales. Comments: On BiPAP  Lungs clear to auscultation  Abdominal:      General: Bowel sounds are normal. There is no distension. Palpations: Abdomen is soft. Tenderness: There is no abdominal tenderness. Musculoskeletal:         General: Tenderness (right shoulder pain) present. Cervical back: Normal range of motion. Right lower leg: No edema. Left lower leg: No edema. Skin:     General: Skin is warm and dry. Findings: No erythema or rash. Neurological:      Mental Status: She is alert and oriented to person, place, and time. Cranial Nerves: No cranial nerve deficit. Psychiatric:         Mood and Affect: Affect is flat. Speech: Speech is delayed (BiPap). Behavior: Behavior normal. Behavior is cooperative.          Thought Content: Thought content normal.         Judgment: Judgment normal.         Labs:   Recent Labs     01/08/22  0717 01/09/22  0953 01/10/22  0848   WBC 4.2* 5.7 6.8   HGB 12.7 12.2 11.7*   HCT 39.5 37.0 36.8*    118* 103*     Recent Labs     01/08/22  0717 01/09/22  0953 01/10/22  0707    132* 137   K 3.9 3.6 3.8    97* 101   CO2 29 24 24   BUN 12 10 11   CREATININE 0.7 0.5 0.5   CALCIUM 7.9* 7.9* 8.2*     No results for input(s): AST, ALT, BILIDIR, BILITOT, ALKPHOS in the last 72 hours. No results for input(s): INR in the last 72 hours. Recent Labs     01/08/22  0717   CKTOTAL 1,410*       Urinalysis:    No results found for: Lennice Leonidas, BACTERIA, RBCUA, BLOODU, SPECGRAV, GLUCOSEU    Radiology:  CT HEAD WO CONTRAST   Final Result   1. No acute disease in the brain. This document has been electronically signed by: Arnold Haas M.D. on    01/09/2022 02:33 AM      All CTs at this facility use dose modulation techniques and iterative    reconstructions, and/or weight-based dosing   when appropriate to reduce radiation to a low as reasonably achievable. CTA CHEST W WO CONTRAST   Final Result    No PE. Bilateral groundglass airspace infiltrates. **This report has been created using voice recognition software. It may contain minor errors which are inherent in voice recognition technology. **      Final report electronically signed by Dr. Song Hollins on 12/31/2021 7:33 PM      XR CHEST PORTABLE   Final Result   Pneumonic infiltrates are scattered throughout the lungs bilaterally. **This report has been created using voice recognition software. It may contain minor errors which are inherent in voice recognition technology. **      Final report electronically signed by Dr Manuela Zhang on 12/31/2021 4:05 PM          Diet: ADULT DIET;  Regular; 4 carb choices (60 gm/meal)  ADULT ORAL NUTRITION SUPPLEMENT; Breakfast, Lunch, Dinner; Standard High Calorie/High Protein Oral

## 2022-01-11 NOTE — PROGRESS NOTES
PROGRESS NOTE      Patient:  Curtis Starks      Unit/Bed:6A-16/016-A    YOB: 1965    MRN: [id number]       Acct: [de-identified]     PCP: No primary care provider on file. Date of Admission: 12/31/2021    Assessment/Plan:    Anticipated Discharge in: pending course    Active Hospital Problems    Diagnosis Date Noted    Acute pain of right shoulder [M25.511]     Hyponatremia [E87.1]     Pneumonia due to COVID-19 virus [U07.1, J12.82] 12/31/2021     Acute hypoxic respiratory failure due to COVID-19  Patient unable to tolerate high flow nasal cannula this morning. On BiPAP 20/14 85% FiO2. Patient took off her oxygen last night and started desaturating. She is requiring BiPAP this morning.   Continue albuterol  Treatment increased to Bipap due to hypoxia, added lorazepam for anxiety of face coverings  Mucinex, vitamin C, Vit D and zinc  Continue incentive spirometry, Acapella when off of Bipap  Decadron high dose 20 mg daily for 5 days followed by 10mg daily for 5 days  Tocilizumab - one time dose already given  Wean O2 as tolerated  Morphine for arm pain and air hunger    Pneumonia due to COVID-19  See treatment above    Hyponatremia-  Resolved    Leukopenia   Likely secondary to COVID-19 viral infection  Possible complication due to steroid use  Trend with CBC    Thrombocytopenia - improved  Likely secondary to viral infection  Platelets: 884 on 33/46/2257  Trending    Depression - improved  Patient on max dose of desvenlafaxine  Continue sertraline    Cough - stable  Guaifenesin-dextromethorphan for cough  Phenergan syrup q4, DC IM due to arm pain  Tessalon pearls    Nausea  Phenergan syrup q4 prn due arm pain from IM    Right arm pain - improving  Associated with phenergan IM injection  No history of arm pain  Elevated CK after phenergan IM injection 898  Lidocaine patch  Morphine  Hold Tramadol and codeine    Anxiety/posttraumatic stress disorder  -Continue Ativan as needed for anxiety    CODE STATUS: Limited x4    Chief Complaint: increasing shortness of breath    Hospital Course:   From HPI:  \"61year-old obese white female presents emergency department with chest pain, shortness of breath and generalized myalgia. Patient is complaining of sore throat patient has been exposed to her spouse who was positive for Covid. Patient is not immunized against Covid. Patient does not have any nausea vomiting or sweating. Does not have any diarrhea or constipation. Patient's past medical history includes depression. \"    01/03/2022  Codeine 30mg for cough with improvement  Tramadol added for pain control    01/04/2022  Increased oxygen support required, high flow NC increased to 60 L/min with SpO2 at 93  Encourage incentive spirometry and acapella as able. 01/04/2022  Added sertraline for worsening depression  Phenergan for nausea  Switched guaifenesin to guaifenesin-dextromethorphan for cough  Codeine switched from q6 to q4 hrs for cough    01/05/2022  Patient improving    01/06/2022  Codeine and tramadol on hold, Norco for right arm pain as pain resulted in sats in low 80's maxed out on high flow nasal canula. Patient refusing BiPap or Non-rebreather. Phenergan IM switch to syrup for cough and nausea    1/07/2022  Patient on maximum high flow nasal cannula. Requesting anxiolytic if BiPAP is needed. Otherwise patient still limited x4 with no intubation. 1/8/2022  Requireing bipap starting overnight, on lorazepam for anxiety    Subjective:  Patient was seen and examined in room this morning. Patient had a rough night last night. She took off her oxygen and started desaturating. It took about 30 minutes for her oxygen levels to come back up. Review of Systems   Constitutional: Negative for chills, fatigue and fever. HENT: Negative for ear pain, postnasal drip and trouble swallowing. Eyes: Negative for pain and visual disturbance. Respiratory: Positive for cough and shortness of breath. Cardiovascular: Negative for chest pain and palpitations. Gastrointestinal: Negative for abdominal pain, blood in stool, constipation, diarrhea, nausea and vomiting. Genitourinary: Negative for dysuria. Musculoskeletal: Positive for myalgias (Improving rt arm pain). Skin: Negative for rash. Neurological: Negative for headaches. Medications:  Reviewed    Infusion Medications    dextrose      sodium chloride 25 mL (01/02/22 1048)    sodium chloride 75 mL/hr at 01/08/22 0521     Scheduled Medications    lidocaine  2 patch TransDERmal Daily    sertraline  25 mg Oral Daily    benzonatate  100 mg Oral 3 times per day    albuterol sulfate HFA  2 puff Inhalation BID    desvenlafaxine succinate  100 mg Oral Daily    sodium chloride flush  5-40 mL IntraVENous 2 times per day    enoxaparin  30 mg SubCUTAneous BID    famotidine  20 mg Oral BID    insulin lispro  0-6 Units SubCUTAneous TID WC    insulin lispro  0-3 Units SubCUTAneous Nightly    Vitamin D  2,000 Units Oral Daily    ascorbic acid  1,000 mg Oral Daily    zinc sulfate  50 mg Oral Daily     PRN Meds: LORazepam, promethazine, morphine, [Held by provider] codeine, guaiFENesin-dextromethorphan, benzocaine-menthol, calcium carbonate, albuterol sulfate HFA, traZODone, [Held by provider] traMADol, glucose, dextrose, glucagon (rDNA), dextrose, sodium chloride flush, sodium chloride, ondansetron **OR** ondansetron, polyethylene glycol, acetaminophen **OR** acetaminophen, magnesium sulfate, potassium chloride      Intake/Output Summary (Last 24 hours) at 1/11/2022 1112  Last data filed at 1/11/2022 0616  Gross per 24 hour   Intake --   Output 1075 ml   Net -1075 ml       Diet:  ADULT DIET;  Regular; 4 carb choices (60 gm/meal)  ADULT ORAL NUTRITION SUPPLEMENT; Breakfast, Lunch, Dinner; Standard High Calorie/High Protein Oral Supplement    Exam:  BP 92/61   Pulse 84   Temp 98.9 °F (37.2 °C) (Axillary)   Resp 25   Ht 5' 7\" (1.702 m)   Wt 179 lb (81.2 kg)   SpO2 100%   BMI 28.04 kg/m²     Physical Exam  Vitals and nursing note reviewed. Constitutional:       General: She is awake. She is not in acute distress. Appearance: She is well-developed. She is ill-appearing. She is not diaphoretic. Interventions: Face mask in place. Comments: Appears ill on BiPAP. HENT:      Head: Normocephalic and atraumatic. Right Ear: External ear normal.      Left Ear: External ear normal.      Nose: Nose normal.      Mouth/Throat:      Mouth: Mucous membranes are moist.   Eyes:      General: No scleral icterus. Right eye: No discharge. Left eye: No discharge. Conjunctiva/sclera: Conjunctivae normal.   Cardiovascular:      Rate and Rhythm: Normal rate and regular rhythm. Heart sounds: Normal heart sounds. No murmur heard. Pulmonary:      Effort: No tachypnea or respiratory distress. Breath sounds: Normal breath sounds. No wheezing, rhonchi or rales. Comments: On BiPAP  Lungs clear to auscultation  Abdominal:      General: Bowel sounds are normal. There is no distension. Palpations: Abdomen is soft. Tenderness: There is no abdominal tenderness. Musculoskeletal:         General: Tenderness (right shoulder pain) present. Cervical back: Normal range of motion. Right lower leg: No edema. Left lower leg: No edema. Skin:     General: Skin is warm and dry. Findings: No erythema or rash. Neurological:      Mental Status: She is alert and oriented to person, place, and time. Cranial Nerves: No cranial nerve deficit. Psychiatric:         Mood and Affect: Affect is flat. Speech: Speech is delayed (BiPap). Behavior: Behavior normal. Behavior is cooperative. Thought Content:  Thought content normal.         Judgment: Judgment normal.         Labs:   Recent Labs     01/09/22  0953 01/10/22  0848 01/11/22  0924   WBC 5.7 6.8 10.7   HGB 12.2 11.7* 12.2   HCT 37.0 36.8* 38.4   * 103* 90*     Recent Labs     01/09/22  0953 01/10/22  0707 01/11/22  0924   * 137 137   K 3.6 3.8 4.0   CL 97* 101 101   CO2 24 24 26   BUN 10 11 10   CREATININE 0.5 0.5 0.5   CALCIUM 7.9* 8.2* 8.2*     No results for input(s): AST, ALT, BILIDIR, BILITOT, ALKPHOS in the last 72 hours. No results for input(s): INR in the last 72 hours. No results for input(s): Fredy Snuffer in the last 72 hours. Urinalysis:    No results found for: Bertha Font, BACTERIA, RBCUA, BLOODU, [address] [place] [address]    Radiology:  CT HEAD WO CONTRAST   Final Result   1. No acute disease in the brain. This document has been electronically signed by: Milagros Sparks M.D. on    01/09/2022 02:33 AM      All CTs at this facility use dose modulation techniques and iterative    reconstructions, and/or weight-based dosing   when appropriate to reduce radiation to a low as reasonably achievable. CTA CHEST W WO CONTRAST   Final Result    No PE. Bilateral groundglass airspace infiltrates. **This report has been created using voice recognition software. It may contain minor errors which are inherent in voice recognition technology. **      Final report electronically signed by Dr. Tigre Light on 12/31/2021 7:33 PM      XR CHEST PORTABLE   Final Result   Pneumonic infiltrates are scattered throughout the lungs bilaterally. **This report has been created using voice recognition software. It may contain minor errors which are inherent in voice recognition technology. **      Final report electronically signed by Dr Gomez Espinoza on 12/31/2021 4:05 PM          Diet: ADULT DIET;  Regular; 4 carb choices (60 gm/meal)  ADULT ORAL NUTRITION SUPPLEMENT; Breakfast, Lunch, Dinner; Standard High Calorie/High Protein Oral Supplement    DVT prophylaxis: [x] Lovenox                                 [] SCDs                                 [] SQ Heparin                                 [] Encourage ambulation           [] Already on Anticoagulation     Disposition:    [x] Home       [] TCU       [] Rehab       [] Psych       [] SNF       [] [place]       [] Other-    Code Status: Limited x4, no intubation however if BiPAP or nonrebreather needed patient requesting medication due to PTSD of face coverings. Electronically signed by Day Keller MD on 1/11/2022 at 11:12 AM    This note was electronically signed. Parts of this note may have been dictated by use of voice recognition software and electronically transcribed. The note may contain errors not detected in proofreading. Please refer to my supervising physician's documentation if my documentation differs.

## 2022-01-11 NOTE — CARE COORDINATION
Discharge Planning Update:     Hospitalist following. Continues on BiPAP, now with FiO2 at 75%. Sats 100%. O2 sats %. Pt was placed on high flow overnight, but pt pulled off. Immediately desaturated to 34%. Tmax 99.0F. Code status Limited x4. Palliative Care following. IVF. Albuterol inhaler bid. Vit C. Tessalon tid, Tums prn, Codeine prn cough, Pristiq, Lovenox, Pepcid bid, Robitussin prn, diabetes management, nausea control, pain control, Zoloft, Vit D, Zinc. Decadron completed.      Patient Goals/Plan/Treatment Preferences: From home with . Pt's  was discharged to [facility] over the weekend. Unsure of discharge plans for pt.  Monitor.

## 2022-01-11 NOTE — PROGRESS NOTES
0040: Tele sitter alarmed due to patient ripping off heated high flow. Patient immediately de-satted to 34%. BiPAP applied and respiratory called. 0050: Patient oxygen saturation 92% on 100% BiPAP. Notified Christina Hairston.     0752: One time dose of ativan ordered.

## 2022-01-12 NOTE — CARE COORDINATION
Discharge Planning Update:     Day #12 of stay. Hospitalist following. Pt remains on BiPAP, with FiO2 at 70%. Respirations continue to be tachypneic in the 30's. IVF. Vit C, Tessalon q8hr, Pristiq, Lovenox, Pepcid, diabetes management, Zoloft, Vit D, Zinc. Albuterol inhaler qid. Palliative Care following. Code status Limited x4. Patient Goals/Plan/Treatment Preferences: From home with . Pt's  was discharged to [facility] over the weekend.  Unsure of discharge plans for pt. Monitor.

## 2022-01-12 NOTE — PROGRESS NOTES
65- Patient's son Friddie Balloon called his RN for an update on patient. Updated at this time and all questions addressed. Friromekristina Jimenez inquired about patient not eating or having any nutrition at this time. RN explained that due to the patient's oxygen saturations dropping very quickly to the 70%s when mask comes off, patient's BIPAP mask off at this time. Friespinoza Gaona asked about other ways to provide patient with nutrition and if this RN could ask the physician. 1616- Perfect serve message sent to Dr. Alex Hong regarding these questions. Dr. Alex Hong wanted this RN to ask the patient directly if she would want or be okay with an NG or PEG tube. 0- RN educated patient on how NG and PEG can be placed to help with nutrition. RN asked if patient would want either. Patient stated \"I'm alright\". When RN asked for patient to give a yes or no answer, patient stated \"no\" to Ng tube and then stated \"I'll wait, I don't know\"   9414- Updated Dr. Alex Hong through [employer] [id number] and updated  Son Yuri Gaona regarding the above and answered all additional questions at this time. Yuri Gaona was appreciative of the update.

## 2022-01-12 NOTE — RT PROTOCOL NOTE
RT Inhaler-Nebulizer Bronchodilator Protocol Note    There is a bronchodilator order in the chart from a provider indicating to follow the RT Bronchodilator Protocol and there is an Initiate RT Inhaler-Nebulizer Bronchodilator Protocol order as well (see protocol at bottom of note). CXR Findings:  No results found. The findings from the last RT Protocol Assessment were as follows:   History Pulmonary Disease: None or smoker <15 pack years  Respiratory Pattern: Severe use of accessory muscle or RR>30 bpm  Breath Sounds: Slightly diminished and/or crackles  Cough: Strong, spontaneous, non-productive  Indication for Bronchodilator Therapy: Decreased or absent breath sounds  Bronchodilator Assessment Score: 10    Aerosolized bronchodilator medication orders have been revised according to the RT Inhaler-Nebulizer Bronchodilator Protocol below. Respiratory Therapist to perform RT Therapy Protocol Assessment initially then follow the protocol. Repeat RT Therapy Protocol Assessment PRN for score 0-3 or on second treatment, BID, and PRN for scores above 3. No Indications - adjust the frequency to every 6 hours PRN wheezing or bronchospasm, if no treatments needed after 48 hours then discontinue using Per Protocol order mode. If indication present, adjust the RT bronchodilator orders based on the Bronchodilator Assessment Score as indicated below. Use Inhaler orders unless patient has one or more of the following: on home nebulizer, not able to hold breath for 10 seconds, is not alert and oriented, cannot activate and use MDI correctly, or respiratory rate 25 breaths per minute or more, then use the equivalent nebulizer order(s) with same Frequency and PRN reasons based on the score. If a patient is on this medication at home then do not decrease Frequency below that used at home.     0-3 - enter or revise RT bronchodilator order(s) to equivalent RT Bronchodilator order with Frequency of every 4 hours PRN for wheezing or increased work of breathing using Per Protocol order mode. 4-6 - enter or revise RT Bronchodilator order(s) to two equivalent RT bronchodilator orders with one order with BID Frequency and one order with Frequency of every 4 hours PRN wheezing or increased work of breathing using Per Protocol order mode. 7-10 - enter or revise RT Bronchodilator order(s) to two equivalent RT bronchodilator orders with one order with TID Frequency and one order with Frequency of every 4 hours PRN wheezing or increased work of breathing using Per Protocol order mode. 11-13 - enter or revise RT Bronchodilator order(s) to one equivalent RT bronchodilator order with QID Frequency and an Albuterol order with Frequency of every 4 hours PRN wheezing or increased work of breathing using Per Protocol order mode. Greater than 13 - enter or revise RT Bronchodilator order(s) to one equivalent RT bronchodilator order with every 4 hours Frequency and an Albuterol order with Frequency of every 2 hours PRN wheezing or increased work of breathing using Per Protocol order mode. RT to enter RT Home Evaluation for COPD & MDI Assessment order using Per Protocol order mode.     Electronically signed by Gavino Skinner RCP on 1/12/2022 at 2:42 PM
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
## 2022-01-13 NOTE — PROGRESS NOTES
Patient son, Kerri Machuca, provided with update at this time. All questions and concerns addressed.
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
## 2022-01-13 NOTE — CARE COORDINATION
Discharge Planning Update:     Day #13 of stay. Hospitalist following. Pt remains on BiPAP, with FiO2 up to 90%. Respirations continue to be tachypneic in the 30's. IVF. Albuterol inhaler tid, Vit C, Tessalon q8hr, Pristiq, Lovenox, Pepcid, diabetes management, Lidocaine patch, Zoloft, Vit D, Zinc, Geodon x1. Palliative Care following. Code status Limited x4. Pt refuses intubation and NG tube. Remains anxious.      Patient Goals/Plan/Treatment Preferences: From home with . Pt's  was discharged to [facility] over the weekend. Unsure of discharge plans for pt. Monitor.

## 2022-01-13 NOTE — PROGRESS NOTES
Called to update patient's son Kristen Swenson. Update given and all questions answered at this time.

## 2022-01-14 NOTE — PROGRESS NOTES
Son updated on starting Precedex IV to assist in relaxing patient as she is restless and thrashing in bed. Sitter at bedside. Verbalized understanding.

## 2022-01-14 NOTE — PROGRESS NOTES
Patient was anxious and agitated all throughout the night. Multiple meds were given and ineefective. Spouse and son came to bedside for comfort. Respiratory tried different masks to help her. PA came to see patient as well as the resource nurse. PA is aware of her high heart rate and her low oxygen sats.

## 2022-01-14 NOTE — PROGRESS NOTES
Updated by Cristina Bosworth RN with palliative care of plan to take patient off of mask and not anticipated to survive long. Discussed with her about patient condition, family wishes, and discussion by provider. Went to floor and introduced self to  in sitting area and son came out of room as nurse arrived. Bonilla Garcia (son) asked to speak to provider again, to confirm he heard everything correctly and no further treatments that could be done to improve patient condition. Voiced that provider had spoke with them but still wanted to make sure he heard everything correctly. Dr. Mary Teixeira came back to unit and discussed with Bonilla Garcia and  Maddie Mccarty about patient grave prognosis and let them know that no other treatments available without intubation. This nurse reviewed code status and agreeable to [facility]. Family tearful and voiced wish to continue with comfort care. Orders obtained from Dr. Mary Teixeira for comfort medications and DNR-CC. Patient premedicated with ativan and had dose of morphine in last hour. BiPAP mask removed, noted work of breathing- dose morphine 4 mg IV given. patient started to relax. This nurse and floor staff stayed with son in room.  choose not to come into room- wished to remember her the way she was. Much support provided to son. Refused spiritual care being called. Patient noted to take last breaths at 1427. Continued support for son and .

## 2022-01-14 NOTE — DEATH NOTES
[address] [place] [address]  Notice of Patient Passing      Patient Name- Ella Ricardo Number- [de-identified]   Attending Physician- Reta Tim DO    Admitted on-2021  3:43 PM     On 2022 at 1427 patient was found in 6A16 with:   Absence of vital signs. Absence of neurological response. Confirmed time of death at 18. Physician or On-call Physician notified of time of death- yes    Family present at time of death- yes, [employer]   Spiritual care present at time of death- no    Physician was notified and orders were obtained to release the body. Post-Mortem documentation completed; form printed, signed, and given to admitting.     Fabiola Arias RN RN Nursing Supervisor/ Manager  22   2:47 PM    ________________________________________________________________________    Name of  Home: [address] [place] [address],First Floor Phone Number: [phone]    Who Will Sign Death Certificate:     [x] Dr. Reta Tim

## 2022-01-14 NOTE — PROGRESS NOTES
Pt's son Marla Moreno and  Brian Lafleur at bedside. Pt is on BiPAP mask and restless in bed. Spoke with Marla Moreno and Brian Lafleur outside of room. Dr. Alex Hong also spoke with them regarding poor prognosis and decline in oxygen saturations despite being on 100% O2. Discussed honoring pt's wishes as no intubation. Dr. Alex Hong suggested comfort care measures as he feels lung damage from covid is irreversible. Describe how to transition to comfort measures and what to expect. Marla Moreno and Brian Lafleur became emotional and asked for time to make some phone calls and to discuss among themselves. Primary nurse Shawanda will call Palliative are when Marla Moreno returns to the room. Much emotional support provided.

## 2022-01-14 NOTE — PROGRESS NOTES
Update received from primary nurse. Spoke with Dr. Joseline Ibarra regarding plan of care. Call placed to pt's son Garret Canales. Updated on declining condition and suggested he and his father return to the hospital. Briefly discussed end of life care. Garret Canales stated they will be coming soon and will discuss comfort measures more at that time. Primary nurse Shawanda updated regarding phone conversation.

## 2022-01-14 NOTE — PROGRESS NOTES
Patient without spontaneous respirations. Auscultated heart tones for 1 minute, absence of heart tones. Verified with Angela Dumont. Son at bedside. Aware of patient .

## 2022-01-14 NOTE — ACP (ADVANCE CARE PLANNING)
Advance Care Planning     Advance Care Planning Inpatient Note  Spiritual Care Department    Today's Date: 1/13/2022  Unit: [facility]    Received request from rounding. Upon review of chart and communication with care team, patient's decision making abilities are not in question. . Patient was/were present in the room during visit. Goals of ACP Conversation:  Discuss advance care planning documents    Health Care Decision Makers:     Summary:  No docs.  is next-of-kin, however he is ill so son Leana Miller has been assisting. Advance Care Planning Documents (Patient Wishes):  None     Assessment:  Patient's  also had COVID; he was just discharged to [facility]. Derrick Miller has been handling things for them both while they are sick. Patient is adamant that she does not want intubation and has changed her code status to limited x4. She is currently on BiPAP and was quite anxious. Deferred conversation as patient was not up to it. Interventions:  Deferred conversation as patient not interested in completing an advance directive at this time    Care Preferences Communicated:   Patient is already limitedx4    Outcomes/Plan:  Spiritual care continues to be available for support.      Electronically signed by Beatrice Estrada on 1/13/2022 at 8:59 PM

## 2022-01-14 NOTE — CARE COORDINATION
Discharge Planning Update:     Day #14 of stay. Hospitalist following. Pt remains on BiPAP, with FiO2 up to 100%. O2 saturations dropping into the 60's. Respirations continue to be tachypneic in the 30's to 40's. -150. Palliative Care following. Code status Limited x4. Pt refuses intubation and NG tube. Remains anxious. Hospice consulted today.      Patient Goals/Plan/Treatment Preferences: From home with . Family to decide regarding comfort measures.
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
## 2022-01-14 NOTE — PROGRESS NOTES
Son Keturah Rhoades took her spouse home. They are going to get a nap and then will come back. If anything changes he said not to hesitate to call him.

## 2022-02-12 NOTE — DISCHARGE SUMMARY
Hospital Medicine Discharge Summary      Patient Identification:   Bahman Patel   : 1965  MRN: [id number]   Account: [de-identified]      Patient's PCP: No primary care provider on file. Admit Date: 2021     Discharge Date: 2022      Admitting Physician: Gino Houston DO     Discharge Physician: Gino Houston DO     Discharge Diagnoses: Active Hospital Problems    Diagnosis Date Noted    Acute pain of right shoulder [M25.511]     Hyponatremia [E87.1]     Pneumonia due to COVID-19 virus [U07.1, J12.82] 2021       The patient was seen and examined on day of discharge and this discharge summary is in conjunction with any daily progress note from day of discharge. Hospital Course:   Bahman Patel is a 61 y.o. female admitted to [address] [place] [address] on 2021 for Covid pneumonia. Assessment and Plan:        1. Acute hypoxic respiratory failure due to COVID-19: Has completed high-dose Decadron therapy and tocilizumab continue on BiPAP, Ativan and morphine for anxiety and air hunger  2. Thrombocytopenia we will check a heparin induced platelet antibody  3. Leukopenia resolved  4. Anxiety/posttraumatic stress disorder continue Ativan as needed for anxiety along with morphine for air hunger     Patient seen this a.m. still very anxious with BiPAP even with Ativan and morphine on board, discussed with nursing we will try Geodon to alleviate some of the anxiety with BiPAP. Patient still is limited x4 refuses intubation. Refuses NG tube for nutritional support.     2022 patient seen this a.m. extremely restlessness we will start Precedex and not titrate. Discussed with nurse and charge nurse,  Also was discussed with pharmacy. Patient was made DNR CC comfort care     2022 at 1427 patient was found in 6A16 with:              Absence of vital signs. Absence of neurological response. Confirmed time of death at 18. Physician or On-call Physician notified of time of death- yes                          Family present at time of death- yes, Sue Friend              Spiritual care present at time of death- no     Physician was notified and orders were obtained to release the body. Post-Mortem documentation completed; form printed, signed, and given to admitting. Exam:     Vitals:  Vitals:    01/14/22 1155 01/14/22 1203 01/14/22 1250 01/14/22 1400   BP: (!) 74/48  (!) 69/42 (!) 69/46   Pulse: 116   129   Resp: (!) 37 (!) 40 (!) 36 (!) 35   Temp:       TempSrc:       SpO2: (!) 81%  (!) 84% (!) 79%   Weight:       Height:         Weight: Weight: 179 lb (81.2 kg)     24 hour intake/output:No intake or output data in the 24 hours ending 02/12/22 1452        Labs: For convenience and continuity at follow-up the following most recent labs are provided:      CBC:    Lab Results   Component Value Date    WBC 19.9 01/14/2022    HGB 11.9 01/14/2022    HCT 37.8 01/14/2022    PLT 65 01/14/2022       Renal:    Lab Results   Component Value Date     01/14/2022    K 4.2 01/14/2022    K 4.0 01/07/2022     01/14/2022    CO2 18 01/14/2022    BUN 11 01/14/2022    CREATININE 0.6 01/14/2022    CALCIUM 8.0 01/14/2022         Significant Diagnostic Studies    Radiology:   XR CHEST PORTABLE   Final Result   Impression:   Extensive bilateral infiltrates. This document has been electronically signed by: Giacomo Julio MD on    01/14/2022 02:27 AM      CT HEAD WO CONTRAST   Final Result   1. No acute disease in the brain. This document has been electronically signed by: Bobo Begum M.D. on    01/09/2022 02:33 AM      All CTs at this facility use dose modulation techniques and iterative    reconstructions, and/or weight-based dosing   when appropriate to reduce radiation to a low as reasonably achievable. CTA CHEST W WO CONTRAST   Final Result    No PE. Bilateral groundglass airspace infiltrates.                **This report has been created using voice recognition software. It may contain minor errors which are inherent in voice recognition technology. **      Final report electronically signed by Dr. Balbina Franklin on 12/31/2021 7:33 PM      XR CHEST PORTABLE   Final Result   Pneumonic infiltrates are scattered throughout the lungs bilaterally. **This report has been created using voice recognition software. It may contain minor errors which are inherent in voice recognition technology. **      Final report electronically signed by Dr Fercho Angelo on 12/31/2021 4:05 PM             Consults:     IP CONSULT TO PHARMACY  PALLIATIVE CARE EVAL  IP CONSULT TO SPIRITUAL SERVICES  IP CONSULT TO HOSPICE    Disposition:    [] Home       [] TCU       [] Rehab       [] Psych       [] SNF       [] [facility]       [x] Other-    Condition at Discharge: Terminal    Code Status:  Prior     Patient Instructions:    Discharge lab work: Activity: bedrest  Diet: No diet orders on file      Follow-up visits:   No follow-up provider specified. Discharge Medications:        Medication List      ASK your doctor about these medications    desvenlafaxine succinate 100 MG Tb24 extended release tablet  Commonly known as: PRISTIQ  Take 1 tablet by mouth daily            Time Spent on discharge is more than 45 minutes in the examination, evaluation, counseling and review of medications and discharge plan. Signed: Thank you No primary care provider on file. for the opportunity to be involved in this patient's care.     Electronically signed by Faby Villa DO on 2/12/2022 at 2:52 PM

## 2023-06-01 NOTE — ED PROVIDER NOTES
[facility] EMERGENCY DEPT      CHIEF COMPLAINT       Chief Complaint   Patient presents with    Mental Health Problem       Nurses Notes reviewed and I agree except as noted in the HPI. HISTORY OF PRESENT ILLNESS    Hui Matson is a 61 y.o. female who presents for mental evaluation. Per Liss Lucio note: Pt reportedly had a 10am visit with Bethene Sport today, state she struggles with anxiety, PTSD and panic attacks related to [facility]". Pt reportedly has not worked in a year due to the [employer] as a result of COVID. The mask mandate was lifted however pt state \"I heard yesterday that they are bringing the mask mandate back\" which increased pts anxiety. Pt state Anh \"determined I was wanting to harm myself, I'm not\". Pt report feeling \"high stress and agitated\" with questions being asked by Bethene Sport today. Pt state \"it would be way easier if this wasn't a problem but I'm not suicidal\". Pt reportedly was informed by Bethene Sport that she had a chose to present to the ED to be evaluated by the [facility] AN AFFILIATE OF [facility] voluntarily or by law enforcement. Pt chose law enforcement, left the office and was picked up at home. Suicidal thoughts denied, homicidal thoughts denied, hallucinations denied, pt report feeling paranoid when in a store with a lot of people \"I get paranoid about what people are saying\". Pt denies alcohol/drug use, report interrupted sleep, normal appetite, no history of inpatient psychiatric treatment, linked to counseling services with an agency on [employer] Nidia Mao, Counselor) and her next appointment is a week from today. Pt state her  and daughter are very supportive Stevenson Simpson are my angels\". Pt report feeling \"embarrassed, mad and anxious\". Pt reportedly was prescribed klonopin approximately a week ago and state her anxiety symptoms have worsened.  Pt is oriented x4, good insight, impaired judgment, circumstantial thought, poor eye contact \"seeing you guys wear a mask makes me more anxious, I stay away from places like this\". Pt endorse feelings of depression. Patient denies physical complaints and says she is quite healthy. She denies chest pain, abdominal pain, any recent fevers or chills, vomiting diarrhea, or other complaints. REVIEW OF SYSTEMS     Review of Systems   Constitutional: Negative for appetite change, chills and fever. HENT: Negative for congestion and rhinorrhea. Eyes: Negative for visual disturbance. Respiratory: Negative for cough and shortness of breath. Cardiovascular: Negative for chest pain. Gastrointestinal: Negative for abdominal pain, nausea and vomiting. Genitourinary: Negative for decreased urine volume. Musculoskeletal: Negative for gait problem. Skin: Negative for rash and wound. Neurological: Negative for headaches. Psychiatric/Behavioral: Positive for agitation and sleep disturbance. Negative for hallucinations, self-injury and suicidal ideas. The patient is nervous/anxious. PAST MEDICAL HISTORY    has no past medical history on file. SURGICAL HISTORY      has a past surgical history that includes Tonsillectomy (); Knee arthroscopy ();  section ( &  ); and other surgical history (). CURRENT MEDICATIONS       Previous Medications    CLONAZEPAM (KLONOPIN) 0.5 MG TABLET    Take 0.5 tablets by mouth 2 times daily as needed for Anxiety for up to 30 days. ALLERGIES     has No Known Allergies. FAMILY HISTORY     She indicated that her mother is alive. She indicated that her father is . She indicated that her brother is alive. family history includes Alcohol Abuse in her father; Cancer in her father; Dementia in her mother; No Known Problems in her brother; Other in her mother. SOCIAL HISTORY    reports that she has never smoked. She has never used smokeless tobacco. She reports that she does not drink alcohol and does not use drugs.     PHYSICAL EXAM     INITIAL VITALS:  height is 5' 7\" (1.702 m) and weight is 180 lb (81.6 kg). Her oral temperature is 98.2 °F (36.8 °C). Her blood pressure is 143/77 (abnormal) and her pulse is 91. Her respiration is 20 and oxygen saturation is 96%. Physical Exam  Vitals and nursing note reviewed. Constitutional:       General: She is not in acute distress. Appearance: She is well-developed. She is not toxic-appearing or diaphoretic. HENT:      Head: Normocephalic and atraumatic. Right Ear: Hearing normal.      Left Ear: Hearing normal.      Nose: Nose normal. No rhinorrhea. Mouth/Throat:      Pharynx: Uvula midline. No oropharyngeal exudate. Eyes:      General: Lids are normal. No scleral icterus. Conjunctiva/sclera: Conjunctivae normal.      Pupils: Pupils are equal, round, and reactive to light. Neck:      Trachea: No tracheal deviation. Cardiovascular:      Rate and Rhythm: Normal rate and regular rhythm. Heart sounds: Normal heart sounds. No murmur heard. Pulmonary:      Effort: Pulmonary effort is normal. No respiratory distress. Breath sounds: Normal breath sounds. No stridor. No decreased breath sounds or wheezing. Abdominal:      General: There is no distension. Palpations: Abdomen is soft. Abdomen is not rigid. Tenderness: There is no abdominal tenderness. Musculoskeletal:         General: Normal range of motion. Cervical back: Normal range of motion and neck supple. No rigidity. Comments: Movement normal as observed   Lymphadenopathy:      Cervical: No cervical adenopathy. Skin:     General: Skin is warm and dry. Coloration: Skin is not pale. Findings: No rash. Neurological:      Mental Status: She is alert and oriented to person, place, and time. Gait: Gait normal.      Comments: No gross deficits observed   Psychiatric:         Mood and Affect: Mood is anxious. Speech: Speech normal.         Behavior: Behavior is agitated. Behavior is cooperative. Thought Content: Thought content normal. Thought content does not include homicidal or suicidal ideation. Thought content does not include suicidal plan. Comments: Poor eye contact         DIFFERENTIAL DIAGNOSIS:   Including but not limited to: Anxiety, panic, bipolar disorder, depression, no evidence for suicidality    DIAGNOSTIC RESULTS     EKG: All EKG's are interpreted by [facility] Phys[facility] who either signs or Co-signs this chart in the absence of a cardiologist.  none    RADIOLOGY: non-plain film images(s) such as CT,Ultrasound and MRI are read by the radiologist.  Plain radiographic images are visualized and preliminarily interpreted by the emergency physician unless otherwise stated below. No orders to display       LABS:   Labs Reviewed - No data to display    EMERGENCY DEPARTMENT COURSE:   Vitals:    Vitals:    08/03/21 1346   BP: (!) 143/77   Pulse: 91   Resp: 20   Temp: 98.2 °F (36.8 °C)   TempSrc: Oral   SpO2: 96%   Weight: 180 lb (81.6 kg)   Height: 5' 7\" (1.702 m)     MDM:  The patient was seen in emergency room by me for mental evaluation. Old records were reviewed. Physical exam revealed an anxious but cooperative patient who exhibited poor eye contact. She had good insight. Labs were not deemed necessary as patient was not suicidal. The patient was thoroughly evaluated by Renuka Vidal from [facility]IL[facility] OF [facility], who consulted Dr. Swapna Dobbins of psychiatry, who advised discharge with follow up at [facility]. EMC was lifted. The patient was discharged with strict return precautions and follow up instructions. All questions were addressed. CRITICAL CARE:   None    CONSULTS:  Blossom (HIGINIO)    PROCEDURES:  None    FINAL IMPRESSION      1. Anxiety state    2. Encounter for psychiatric assessment          DISPOSITION/PLAN     1. Anxiety state    2.  Encounter for psychiatric assessment        PATIENT REFERRED TO:  [facility] [address] [place] [id number] [phone]  Schedule an appointment as soon as possible for a visit         DISCHARGE MEDICATIONS:  New Prescriptions    No medications on file       (Please note that portions of this note were completed with a voice recognition program.  Efforts were made to edit the dictations but occasionally words are mis-transcribed.)    Chelsea Whitfield PA-C 08/03/21 3:44 PM    JEB Arreguin PA-C  08/05/21 [id number] Island Pedicle Flap Text: The defect edges were debeveled with a #15 scalpel blade.  Given the location of the defect, shape of the defect and the proximity to free margins an island pedicle advancement flap was deemed most appropriate.  Using a sterile surgical marker, an appropriate advancement flap was drawn incorporating the defect, outlining the appropriate donor tissue and placing the expected incisions within the relaxed skin tension lines where possible.    The area thus outlined was incised deep to adipose tissue with a #15 scalpel blade.  The skin margins were undermined to an appropriate distance in all directions around the primary defect and laterally outward around the island pedicle utilizing iris scissors.  There was minimal undermining beneath the pedicle flap.

## 2024-04-23 NOTE — PROCEDURES
A Bladder scan was performed at 1620 . The residual amount was measured to be 571 ML. Report of results was given to Shayne Kiran. Patient here for Post Op Exam  Surgery Date: 3/13/2024  PVR : 1 mL  Good #: [phone]   Pharmacy Verified

## (undated) DEVICE — SPONGE GZ W4XL4IN COT 12 PLY TYP VII WVN C FLD DSGN

## (undated) DEVICE — PACK PROCEDURE SURG PLAS SC MIN SRHP LF

## (undated) DEVICE — Device

## (undated) DEVICE — SOLUTION IV 1000ML 0.9% SOD CHL PH 5 INJ USP VIAFLX PLAS

## (undated) DEVICE — TUBING, SUCTION, 1/4" X 12', STRAIGHT: Brand: MEDLINE

## (undated) DEVICE — BANDAGE COMPR M W4INXL10YD WHT BGE VELC E MTRX HK AND LOOP

## (undated) DEVICE — GLOVE ORANGE PI 7   MSG9070

## (undated) DEVICE — GLOVE SURG SZ 8 L11.77IN FNGR THK9.8MIL STRW LTX POLYMER

## (undated) DEVICE — BANDAGE,GAUZE,4.5"X4.1YD,STERILE,LF: Brand: MEDLINE

## (undated) DEVICE — DRESSING,GAUZE,XEROFORM,CURAD,5"X9",ST: Brand: CURAD

## (undated) DEVICE — GOWN,SIRUS,NON REINFRCD,LARGE,SET IN SL: Brand: MEDLINE